# Patient Record
Sex: MALE | Race: AMERICAN INDIAN OR ALASKA NATIVE
[De-identification: names, ages, dates, MRNs, and addresses within clinical notes are randomized per-mention and may not be internally consistent; named-entity substitution may affect disease eponyms.]

---

## 2017-03-10 ENCOUNTER — HOSPITAL ENCOUNTER (EMERGENCY)
Dept: HOSPITAL 74 - JER | Age: 72
Discharge: HOME | End: 2017-03-10
Payer: COMMERCIAL

## 2017-03-10 VITALS — HEART RATE: 76 BPM | SYSTOLIC BLOOD PRESSURE: 117 MMHG | DIASTOLIC BLOOD PRESSURE: 79 MMHG | TEMPERATURE: 98 F

## 2017-03-10 VITALS — BODY MASS INDEX: 26.6 KG/M2
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
## 2017-03-10 DIAGNOSIS — J95.09: Primary | ICD-10-CM

## 2017-03-10 DIAGNOSIS — Z79.01: ICD-10-CM

## 2017-03-10 DIAGNOSIS — Z95.2: ICD-10-CM

## 2017-03-10 DIAGNOSIS — E78.5: ICD-10-CM

## 2017-03-10 DIAGNOSIS — J44.9: ICD-10-CM

## 2017-03-10 DIAGNOSIS — Z79.4: ICD-10-CM

## 2017-03-10 DIAGNOSIS — Z79.84: ICD-10-CM

## 2017-03-10 DIAGNOSIS — I11.9: ICD-10-CM

## 2017-03-10 DIAGNOSIS — I25.2: ICD-10-CM

## 2017-03-10 DIAGNOSIS — Z95.1: ICD-10-CM

## 2017-03-10 DIAGNOSIS — Z95.0: ICD-10-CM

## 2017-03-10 DIAGNOSIS — E11.9: ICD-10-CM

## 2017-03-10 PROCEDURE — 0B21XFZ CHANGE TRACHEOSTOMY DEVICE IN TRACHEA, EXTERNAL APPROACH: ICD-10-PCS

## 2017-03-10 NOTE — PDOC
History of Present Illness





- General


History Source: Patient, Family, Old Records


Exam Limitations: No Limitations





<Rodolfo Maxwell - Last Filed: 03/10/17 17:30>





- General


History Source: Patient, Family (Sons), Old Records


Exam Limitations: Clinical Condition





- History of Present Illness


Initial Comments: 


03/10/17 17:58





The patient is a 71 year old male, with a significant past medical history of 

anemia, hypertension, hyperlipidemia, diabetes, COPD s/p tracheostomy, chronic 

vent, coronary artery disease s/p MI (2013)/CABG/aortic valve replacement,  who 

presents to the emergency department needing a trach replacement. The patients 

sons are at the bedside. They state that the clip on the left side of his trach 

broke this morning. They report that they spoke to the patients ENT (Dr. Mcqueen) 

who advised them to come to the office for a trach replacement or to visit the 

ED for evaluation. They deny any fevers, cough, respiratory distress or any 

recent illnesses. HPI is primarily provided by the patients sons due to the 

patients baseline clinical condition. 





Allergies: None reported.


Past Surgical History: CABG; Aortic Valve Replacement; Pacemaker. 


Social History: Non smoker. Denies alcohol or drug use. 


PCP: Dr. Alvarez Cohn


ENT: Dr. Naresh Mcqueen 





<Sabi Moon - Last Filed: 03/10/17 22:40>





- General


Chief Complaint: Trach Tube Replacement


Stated Complaint: Trach Tube Replacement


Time Seen by Provider: 03/10/17 16:04





Past History





- Past Medical History


Anemia: Yes


Cardiac Disorders: Yes (MI in 2013, triple bypass and valve replacement)


COPD: Yes


CHF: Yes


Diabetes: Yes


HTN: Yes


Hypercholesterolemia: Yes





- Surgical History


Cardiac Surgery: Yes (triple bypass 2012/valve replacement)





- Immunization History


Immunization Up to Date: Yes





- Psycho/Social/Smoking Cessation Hx


Anxiety: No


Suicidal Ideation: No


Smoking History: Unknown if ever smoked


Have you smoked in the past 12 months: No


If you are a former smoker, when did you quit?: 2013


Information on smoking cessation initiated: No


Hx Alcohol Use: No


Drug/Substance Use Hx: No


Substance Use Type: None


Hx Substance Use Treatment: No





<Rodolfo Maxwell - Last Filed: 03/10/17 17:30>





<Sabi Moon - Last Filed: 03/10/17 22:40>





- Past Medical History


Allergies/Adverse Reactions: 


 Allergies











Allergy/AdvReac Type Severity Reaction Status Date / Time


 


No Known Allergies Allergy   Verified 03/10/17 16:08











Home Medications: 


Ambulatory Orders





Pantoprazole Sodium [Protonix -] 20 mg PO DAILY 04/20/16 


Acetaminophen [Tylenol .Regular Strength -] 650 mg PO Q6H PRN #0 tablet 11/10/

16 


Albuterol 2.5/Ipratropium 0.5 [Duoneb -] 1 amp NEB Q6H PRN #0 amp 11/10/16 


Alprazolam [Xanax] 0.25 mg PO Q8H PRN #90 tablet MDD 3 11/10/16 


Amino Acids/Protein Hydrolys [Prostat Sugar-Free Packet -] 30 ml PO BID@0800,

1730 #60 packet 11/10/16 


Aspirin [ASA -] 81 mg PO DAILY #30 tab.chew 11/10/16 


Atorvastatin Ca [Lipitor] 10 mg PO HS #30 tablet 11/10/16 


Cefuroxime Axetil [Ceftin -] 500 mg PO BID #4 tablet 11/10/16 


Ferrous Sulfate [Ferosul] 300 mg PO DAILY #1 bottle 11/10/16 


Furosemide [Lasix -] 20 mg PO BID@0600,1400 #60 tablet 11/10/16 


Insulin Detemir [Levemir Flextouch] 5 unit SQ DAILY #1 syringe 11/10/16 


Isopropyl Alcohol [Alcoh-Wipe] 1 each  BID #1 box 11/10/16 


Lancets/Blood Glucose Strips [Fora D10-[id number]-D77 Strp-Lnct] 1 each  BID #1 

combo..pkg 11/10/16 


Metformin HCl [Glucophage -] 500 mg PO BID@0700,1630 #60 tablet 11/10/16 


Metoprolol Succinate [Toprol XL -] 12.5 mg PO BID #60 tab.sr.24h 11/10/16 


Miscellaneous Medical Supply [Glucometer Device] 1 each [place] BID #1 kit 11/10/16 


Multivitamins [Multivit ([facility] Formul[facility])] 1 tab PO DAILY #30 tab 11/10/16 


Prednisone [Deltasone -] 7.5 mg PO DAILY #30 tablet 11/10/16 


Sennosides [Senna -] 1 tab PO BID #60 tablet 11/10/16 


Tamsulosin HCl [Flomax -] 0.4 mg PO DAILY@0830 #30 cap.er.24h 11/10/16 











**Review of Systems





- Review of Systems


Able to Perform ROS?: No


Comments:: 


03/10/17 17:46





Unable to perform ROS due to patients baseline clinical condition. 





<Sabi Moon - Last Filed: 03/10/17 22:40>





*Physical Exam





- Vital Signs


 Last Vital Signs











Temp Pulse Resp BP Pulse Ox


 


 98.0 F   76   20   117/79   100 


 


 03/10/17 16:08  03/10/17 16:08  03/10/17 16:08  03/10/17 16:08  03/10/17 16:08














<Rodolfo Maxwell - Last Filed: 03/10/17 17:30>





- Vital Signs


 Last Vital Signs











Temp Pulse Resp BP Pulse Ox


 


 98.0 F   76   20   117/79   100 


 


 03/10/17 16:08  03/10/17 16:08  03/10/17 16:08  03/10/17 16:08  03/10/17 16:08














- Physical Exam


Comments: 


03/10/17 17:45





GENERAL: Awake, alert, and fully oriented, in no acute distress. 


HEAD: No signs of trauma. 


EYES: PERRLA, EOMI, sclera anicteric, conjunctiva clear. 


ENT: Trach site is clean, dry and intact. Broken clip on left side of trach. 

Auricles normal inspection, hearing grossly normal, nares patent, oropharynx 

clear without exudates. Moist mucosa. 


NECK: Normal ROM, supple, no lymphadenopathy, JVD, or masses. 


LUNGS: Breath sounds equal, clear to auscultation bilaterally. No wheezes, and 

no crackles. 


HEART: Regular rate and rhythm, normal S1 and S2, no murmurs, rubs or gallops. 


ABDOMEN: Soft, nontender, normoactive bowel sounds. No guarding, no rebound. No 

masses. 


EXTREMITIES: Normal range of motion, no edema. No clubbing or cyanosis. No cords

, erythema, or tenderness. 


NEUROLOGICAL: Cranial nerves II through XII intact. Normal speech, gait 

deferred. 


SKIN: Warm, dry, normal turgor, no rashes or lesions noted. 





<Sabi Moon - Last Filed: 03/10/17 22:40>





ED Treatment Course





- RADIOLOGY


Radiology Studies Ordered: 














 Category Date Time Status


 


 CHEST X-RAY PORTABLE* [RAD] Stat Radiology  03/10/17 16:27 Taken














<Rodolfo Maxwell - Last Filed: 03/10/17 17:30>





Medical Decision Making





- Medical Decision Making





03/10/17 17:32





A portion of this note was documented by scribe services under my direction. I 

have reviewed the details of the note, within reason, and agree with the 

documentation with the following case summary and management plan written by 

me. 





Patient treated in the ED.





Nursing notes are reviewed and incorporated into the medical decision-making.


Vital signs reviewed.





Peripheral IV access obtained by the nurse, laboratory studies are drawn and 

sent, reviewed and interpreted by myself. 





 Vital Signs











Temp Pulse Resp BP Pulse Ox


 


 98.0 F   76   20   117/79   100 


 


 03/10/17 16:08  03/10/17 16:08  03/10/17 16:08  03/10/17 16:08  03/10/17 16:08








71 year old male with Past medical history of coronary disease, hypertension, 

hyperlipidemia, status post aortic valve replacement, COPD, diabetes, anemia, 

tracheostomy for COPD, chronically vent, lives at home brought in by sons for 

trach replacement. Patient has no complaints and is otherwise well. He has no 

respiratory distress, fevers, chills, cough. One of the clips had broken off. 

They had called Dr. Mcqueen's office would by the patient to go to the ER or to 

their office. Patient sons brought the patient to the ER for trach exchange.





I do not have any concerns for pneumonia or respiratory distress at this time. 

With the patient's and family's consent, we had exchange the trach with a 

cuffed 8.0 trach. The process was uncomplicated and there was no bleeding. 

Patient is breathing completely throughout the process. This was done with the 

airway box, ventilator, monitor, suction, respiratory therapist at the bedside. 

Chest x-ray was obtained and demonstrated proper placement of the trach. 

Patient feels comfortable to go home. I had contacted Dr. Mcqueen and let him aware 

of the details.





I discussed the physical exam findings, ancillary test results and final 

diagnoses with the patient.  I answered all of the patient's questions.  The 

patient was satisfied with the care received and felt comfortable with the 

discharge plan and treatment plan.  The patient will call their primary care 

physician within 24 hours to arrange follow-up and will return to the Emergency 

Department with any new, persistant or worsening symptoms. 





<Rodolfo Maxwell - Last Filed: 03/10/17 17:30>





- Medical Decision Making


03/10/17 17:45





Call placed to Dr. Naresh Mcqueen [phone] at 16:27. Connected and case 

discussed. 





EXAM: RAD/CHEST X-RAY PORTABLE


Reviewed By: Dr. Jaime Duke


IMPRESSION: Since 11/4/2016, the cardiac silhouette remains within normal 

limits in size. Tracheostomy tube is in satisfactory position. A left sided 

pacer again seen obscuring visualization of the left upper lobe, laterally. 

Status post median sternotomy. There are minimal perihilar increased lung 

markings without evidence of acute lung disease. Mediastinum and visualized 

osseous structures appear grossly intact. 





<Sabi Moon - Last Filed: 03/10/17 22:40>





*DC/Admit/Observation/Transfer





- Discharge Dispostion


Admit: No





<Rodolfo Maxwell - Last Filed: 03/10/17 17:30>





- Attestations


Scribe Attestion: 


03/10/17 17:34





Documentation prepared by Sabi Moon, acting as medical scribe for Rodolfo Maxwell MD. 





<Sabi Moon - Last Filed: 03/10/17 22:40>


Diagnosis at time of Disposition: 


 Encounter for tracheostomy tube change





- Discharge Dispostion


Disposition: HOME


Condition at time of disposition: Improved





- Referrals


Referrals: 


Alvarez Cohn MD [Primary Care Provider] - 





- Patient Instructions


Printed Discharge Instructions:  How to Take Care of a Tracheostomy


Additional Instructions: 


Your trach has been exchanged today with a 8.0 cuffed tube.


Please follow up with your doctor.


If you notice difficulty breathing or uncontrollable bleeding, please return to 

the ER for further evaluation.

## 2017-07-31 ENCOUNTER — HOSPITAL ENCOUNTER (EMERGENCY)
Dept: HOSPITAL 74 - JER | Age: 72
Discharge: HOME | End: 2017-07-31
Payer: COMMERCIAL

## 2017-07-31 VITALS — BODY MASS INDEX: 25 KG/M2

## 2017-07-31 VITALS — DIASTOLIC BLOOD PRESSURE: 68 MMHG | HEART RATE: 88 BPM | SYSTOLIC BLOOD PRESSURE: 114 MMHG

## 2017-07-31 VITALS — TEMPERATURE: 98 F

## 2017-07-31 DIAGNOSIS — I25.2: ICD-10-CM

## 2017-07-31 DIAGNOSIS — E78.00: ICD-10-CM

## 2017-07-31 DIAGNOSIS — Z95.1: ICD-10-CM

## 2017-07-31 DIAGNOSIS — I11.0: ICD-10-CM

## 2017-07-31 DIAGNOSIS — I25.10: ICD-10-CM

## 2017-07-31 DIAGNOSIS — J95.03: Primary | ICD-10-CM

## 2017-07-31 DIAGNOSIS — Z95.2: ICD-10-CM

## 2017-07-31 NOTE — PDOC
History of Present Illness





- General


Chief Complaint: Trach Tube Replacement


Stated Complaint: trach replacement


Time Seen by Provider: 07/31/17 11:22





- History of Present Illness


Initial Comments: 


07/31/17 19:12


73 yo M with complex past medical history who presents with tracheotomy care/

change. Last changed 3/2017. Pt. presents to ED for trach care at advice of ENT 

physician Dr. Mcqueen. Pt. son at bedside states that the strap insert broke on the 

left side. Recently evaluated at home by physician with complete metabolic 

workup. Son states that they perform trach care BID. Denies fevers/chills, 

change in appetite, wt change, chest pain, cough, acute GI or urinary symptoms. 











Past History





- Past Medical History


Allergies/Adverse Reactions: 


 Allergies











Allergy/AdvReac Type Severity Reaction Status Date / Time


 


No Known Allergies Allergy   Verified 03/10/17 16:08











Home Medications: 


Ambulatory Orders





Pantoprazole Sodium [Protonix -] 20 mg PO DAILY 04/20/16 


Acetaminophen [Tylenol .Regular Strength -] 650 mg PO Q6H PRN #0 tablet 11/10/

16 


Albuterol 2.5/Ipratropium 0.5 [Duoneb -] 1 amp NEB Q6H PRN #0 amp 11/10/16 


Alprazolam [Xanax] 0.25 mg PO Q8H PRN #90 tablet MDD 3 11/10/16 


Amino Acids/Protein Hydrolys [Prostat Sugar-Free Packet -] 30 ml PO BID@0800,

1730 #60 packet 11/10/16 


Aspirin [ASA -] 81 mg PO DAILY #30 tab.chew 11/10/16 


Atorvastatin Ca [Lipitor] 10 mg PO HS #30 tablet 11/10/16 


Cefuroxime Axetil [Ceftin -] 500 mg PO BID #4 tablet 11/10/16 


Ferrous Sulfate [Ferosul] 300 mg PO DAILY #1 bottle 11/10/16 


Furosemide [Lasix -] 20 mg PO BID@0600,1400 #60 tablet 11/10/16 


Insulin Detemir [Levemir Flextouch] 5 unit SQ DAILY #1 syringe 11/10/16 


Isopropyl Alcohol [Alcoh-Wipe] 1 each [place] BID #1 box 11/10/16 


Lancets/Blood Glucose Strips [Fora G06-C95-B62-K19 Strp-Lnct] 1 each [facility] BID #1 

combo..pkg 11/10/16 


Metformin HCl [Glucophage -] 500 mg PO BID@0700,1630 #60 tablet 11/10/16 


Metoprolol Succinate [Toprol XL -] 12.5 mg PO BID #60 tab.sr.24h 11/10/16 


Miscellaneous Medical Supply [Glucometer Device] 1 each [place] BID #1 kit 11/10/16 


Multivitamins [Multivit ([facility] Formul[facility])] 1 tab PO DAILY #30 tab 11/10/16 


Prednisone [Deltasone -] 7.5 mg PO DAILY #30 tablet 11/10/16 


Sennosides [Senna -] 1 tab PO BID #60 tablet 11/10/16 


Tamsulosin HCl [Flomax -] 0.4 mg PO DAILY@0830 #30 cap.er.24h 11/10/16 








Anemia: Yes


Cardiac Disorders: Yes (MI in 2013, triple bypass and valve replacement)


COPD: Yes


CHF: Yes


Diabetes: Yes


HTN: Yes


Hypercholesterolemia: Yes





- Surgical History


Cardiac Surgery: Yes (triple bypass 2012/valve replacement)





- Immunization History


Immunization Up to Date: Yes





- Psycho/Social/Smoking Cessation Hx


Anxiety: No


Suicidal Ideation: No


Smoking History: Unknown if ever smoked


Have you smoked in the past 12 months: No


If you are a former smoker, when did you quit?: 2013


Hx Alcohol Use: No


Drug/Substance Use Hx: No


Substance Use Type: None


Hx Substance Use Treatment: No





**Review of Systems





- Review of Systems


Comments:: 





07/31/17 19:16


GENERAL/CONSTITUTIONAL: No fever or chills. No weakness.


HEAD, EYES, EARS, NOSE AND THROAT: No change in vision. No ear pain or 

discharge. No sore throat.


CARDIOVASCULAR: No chest pain or shortness of breath


RESPIRATORY: No cough, wheezing, or hemoptysis.


GASTROINTESTINAL: No nausea, vomiting, diarrhea or constipation.


GENITOURINARY: No dysuria, frequency, or change in urination.


MUSCULOSKELETAL: No joint or muscle swelling or pain. No neck or back pain.


SKIN: No rash


NEUROLOGIC: No headache, vertigo, loss of consciousness, or change in strength/

sensation.


ENDOCRINE: No increased thirst. No abnormal weight change


HEMATOLOGIC/LYMPHATIC: No anemia, easy bleeding, or history of blood clots.


ALLERGIC/IMMUNOLOGIC: No hives or skin allergy.








*Physical Exam





- Vital Signs


 Last Vital Signs











Temp Pulse Resp BP Pulse Ox


 


 98.0 F   81   13   112/88   100 


 


 07/31/17 11:05  07/31/17 11:10  07/31/17 11:10  07/31/17 11:05  07/31/17 11:10














- Physical Exam


Comments: 





07/31/17 19:17





GENERAL: Awake, alert, and fully oriented, in no acute distress


HEAD: No signs of trauma, normocephalic, atraumatic 


EYES: PERRLA, EOMI, sclera anicteric, conjunctiva clear


ENT: Trach is midline and in place. c/d/i with absent signs of discharge,leakage

, or erythema from site of trach insertion site.  Auricles normal inspection, 

hearing grossly normal, nares patent, oropharynx clear without


exudates. Moist mucosa


NECK: Normal ROM, supple, no lymphadenopathy, JVD, or masses


LUNGS: No distress, speaks full sentences, clear to auscultation bilaterally 


HEART: Regular rate and rhythm, normal S1 and S2, no murmurs, rubs or gallops, 

peripheral pulses normal and equal bilaterally. 


ABDOMEN: Soft, nontender, normoactive bowel sounds.  No guarding, no rebound.  

No masses


EXTREMITIES: Normal inspection, Normal range of motion, no edema.  No clubbing 

or cyanosis. 


NEUROLOGICAL: Cranial nerves II through XII grossly intact.  Normal speech, 

normal gait, no focal sensorimotor deficits 


SKIN: Warm, Dry, normal turgor, no rashes or lesions noted. 








Procedures





- Intubation


Intubation Method: orotracheal (Tracheostomy tubing )


Tube Size (Fr): 8.0


Tube position confirmed by: Direct visualization, Breath sounds (Symmetric BL )


Breath Sounds after Intubation: equal


Intubation Complications: no complications


Post Intubation Xray: Yes





- Additional Procedures


Additional Procedures: tracheostomy





**Heart Score/ECG Review





- History


History: Slightly suspicious





- ECG Intrepretation


Rhythm: Regular Rhythm





- Axis


Axis: Left Axis Deviation





- ECG Impressions


Bradycardia: Yes


Heart Block: 1st Degree Block(>20mils)


Torsades arturo Pointes: No


WPW: No





ED Treatment Course





- RADIOLOGY


Radiology Studies Ordered: 














 Category Date Time Status


 


 CXRPORT [CHEST X-RAY PORTABLE*] [RAD] Stat Radiology  07/31/17 12:00 Ordered














Medical Decision Making





- Medical Decision Making





07/31/17 19:20


73 yo M with complex pmh who presents for tracheotomy change. Pt. and family 

state that he is instructed to come to ED every 3 months for trach change by 

ENT Dr. Mcqueen. Denies any signs or symptoms of infection at insertion site. No s/

s respiratory distress or complaints of aspiration. Pt. is stable on 

ventilation settings. EMS to take pt. home. 





ED Course: 


Trach replacement with size 8 cuffed non-fenestrated tubing. No complications 

with minimal blood loss from procedure. 





*DC/Admit/Observation/Transfer


Diagnosis at time of Disposition: 


 Tracheostomy care





- Discharge Dispostion


Admit: No





- Referrals


Referrals: 


Alvarez Cohn MD [Staff Physician] - 





- Patient Instructions


Printed Discharge Instructions:  Tracheotomy-Adult


Additional Instructions: 


Please return to ED in 3-4 months and follow up with Dr. Mcqueen in the interim. 

Return to ED if you experience any leaking, burning, or signs of infection from 

tracheostomy site. Thank you. 


Print Language: ENGLISH





- Attestations


Physician Attestion: 





07/31/17 12:27








I, Dr. Fito Douglas, attest that this document has been prepared under my 

direction and personally reviewed by me in its entirety.   I further attest, 

that it accurately reflects all work, treatment, procedures and medical decision

-making performed by me.

## 2017-07-31 NOTE — PDOC
Attending Attestation





- Resident


Resident Name: Fito Douglas





- ED Attending Attestation


I have performed the following: I have examined & evaluated the patient, The 

case was reviewed & discussed with the resident, I agree w/resident's findings 

& plan, Exceptions are as noted





- HPI


HPI: 





07/31/17 12:27


72-year-old male with multiple medical problems presents to the ED for routine 

tracheostomy change, last performed in March 2017. No complaints other than the 

plastic haas breaking a few days ago, no f/c/respiratory distress. 











- Physicial Exam


PE: 





07/31/17 12:31


VSS, well appearing


 Trach in place on vent, no drainage/bleeding, lungs clear











- Medical Decision Making





07/31/17 12:31


Patient seen and evaluated with the resident. I agree with the overall 

evaluation, assessment, and management with the following summary of visit:





With respiratory at bedside, O2 and suction setup, current trach was exchanged 

for new 8 cuffed non-fenestrated tube without complication. no bleeding. 

tolerated well, normal respirations on vent, trach placement confirmed with x-

ray. 


Son and EMS at bedside to accompany patient back home for continued care. They 

understand return criteria.

## 2017-07-31 NOTE — EKG
Test Reason : 

Blood Pressure : ***/*** mmHG

Vent. Rate : 080 BPM     Atrial Rate : 080 BPM

   P-R Int : 214 ms          QRS Dur : 070 ms

    QT Int : 338 ms       P-R-T Axes : 085 -48 101 degrees

   QTc Int : 389 ms

 

*** POOR DATA QUALITY, INTERPRETATION MAY BE ADVERSELY AFFECTED

SINUS RHYTHM WITH 1ST DEGREE A-V BLOCK

LEFT AXIS DEVIATION

LOW VOLTAGE QRS

CANNOT RULE OUT ANTEROSEPTAL INFARCT (CITED ON OR BEFORE 03-NOV-2016)

ABNORMAL ECG

WHEN COMPARED WITH ECG OF 03-NOV-2016 12:08,

COMPARED TO EKG

NO SIGNIFICANT CHANGE IS FOUND

Confirmed by KATERINE MAYA MD ([id number]) on 7/31/2017 12:13:36 PM

 

Referred By:             Confirmed By:KATERINE MAYA MD

## 2018-01-05 ENCOUNTER — HOSPITAL ENCOUNTER (INPATIENT)
Dept: HOSPITAL 74 - JER | Age: 73
LOS: 11 days | DRG: 207 | End: 2018-01-16
Attending: FAMILY MEDICINE | Admitting: FAMILY MEDICINE
Payer: COMMERCIAL

## 2018-01-05 VITALS — BODY MASS INDEX: 28.1 KG/M2

## 2018-01-05 DIAGNOSIS — F51.9: ICD-10-CM

## 2018-01-05 DIAGNOSIS — G47.00: ICD-10-CM

## 2018-01-05 DIAGNOSIS — I25.10: ICD-10-CM

## 2018-01-05 DIAGNOSIS — I25.5: ICD-10-CM

## 2018-01-05 DIAGNOSIS — E87.5: ICD-10-CM

## 2018-01-05 DIAGNOSIS — I10: ICD-10-CM

## 2018-01-05 DIAGNOSIS — E11.65: ICD-10-CM

## 2018-01-05 DIAGNOSIS — I95.9: ICD-10-CM

## 2018-01-05 DIAGNOSIS — R74.0: ICD-10-CM

## 2018-01-05 DIAGNOSIS — Z93.0: ICD-10-CM

## 2018-01-05 DIAGNOSIS — J96.21: ICD-10-CM

## 2018-01-05 DIAGNOSIS — G93.1: ICD-10-CM

## 2018-01-05 DIAGNOSIS — D72.829: ICD-10-CM

## 2018-01-05 DIAGNOSIS — I25.2: ICD-10-CM

## 2018-01-05 DIAGNOSIS — R33.9: ICD-10-CM

## 2018-01-05 DIAGNOSIS — Z95.1: ICD-10-CM

## 2018-01-05 DIAGNOSIS — I46.9: ICD-10-CM

## 2018-01-05 DIAGNOSIS — N17.9: ICD-10-CM

## 2018-01-05 DIAGNOSIS — E78.5: ICD-10-CM

## 2018-01-05 DIAGNOSIS — J96.22: Primary | ICD-10-CM

## 2018-01-05 DIAGNOSIS — D64.9: ICD-10-CM

## 2018-01-05 DIAGNOSIS — Z99.11: ICD-10-CM

## 2018-01-05 DIAGNOSIS — E87.2: ICD-10-CM

## 2018-01-05 DIAGNOSIS — J44.9: ICD-10-CM

## 2018-01-05 DIAGNOSIS — E87.0: ICD-10-CM

## 2018-01-05 DIAGNOSIS — I48.92: ICD-10-CM

## 2018-01-05 DIAGNOSIS — R56.9: ICD-10-CM

## 2018-01-05 LAB
ALBUMIN SERPL-MCNC: 2.6 G/DL (ref 3.4–5)
ALP SERPL-CCNC: 68 U/L (ref 45–117)
ALT SERPL-CCNC: 229 U/L (ref 12–78)
ANION GAP SERPL CALC-SCNC: 13 MMOL/L (ref 8–16)
ARTERIAL BLOOD GAS PCO2: 68.6 MMHG (ref 35–45)
ARTERIAL PATENCY WRIST A: POSITIVE
AST SERPL-CCNC: 296 U/L (ref 15–37)
BASE EXCESS BLDA CALC-SCNC: 1.7 MEQ/L (ref -2–2)
BILIRUB SERPL-MCNC: 0.5 MG/DL (ref 0.2–1)
BUN SERPL-MCNC: 26 MG/DL (ref 7–18)
CALCIUM SERPL-MCNC: 10.2 MG/DL (ref 8.5–10.1)
CHLORIDE SERPL-SCNC: 96 MMOL/L (ref 98–107)
CO2 SERPL-SCNC: 28 MMOL/L (ref 21–32)
COHGB MFR BLD: 1 GM% (ref 0.5–2)
CREAT SERPL-MCNC: 1.2 MG/DL (ref 0.7–1.3)
DEPRECATED RDW RBC AUTO: 18 % (ref 11.9–15.9)
GLUCOSE SERPL-MCNC: 463 MG/DL (ref 74–106)
HCT VFR BLD CALC: 35 % (ref 35.4–49)
HGB BLD-MCNC: 10.8 GM/DL (ref 11.7–16.9)
INR BLD: 1.3 (ref 0.82–1.09)
MAGNESIUM SERPL-MCNC: 1.8 MG/DL (ref 1.8–2.4)
MCH RBC QN AUTO: 25 PG (ref 25.7–33.7)
MCHC RBC AUTO-ENTMCNC: 30.9 G/DL (ref 32–35.9)
MCV RBC: 80.9 FL (ref 80–96)
PHOSPHATE SERPL-MCNC: 6.8 MG/DL (ref 2.5–4.9)
PLATELET # BLD AUTO: 205 K/MM3 (ref 134–434)
PMV BLD: 8.8 FL (ref 7.5–11.1)
PO2 BLDA: 377 MMHG (ref 70–100)
POTASSIUM SERPLBLD-SCNC: 3.3 MMOL/L (ref 3.5–5.1)
PROT SERPL-MCNC: 5.4 G/DL (ref 6.4–8.2)
PT PNL PPP: 14.7 SEC (ref 9.98–11.88)
RBC # BLD AUTO: 4.33 M/MM3 (ref 4–5.6)
SAO2 % BLDA: 99.7 % (ref 90–98.9)
SODIUM SERPL-SCNC: 137 MMOL/L (ref 136–145)
WBC # BLD AUTO: 12.8 K/MM3 (ref 4–10)

## 2018-01-05 RX ADMIN — SODIUM CHLORIDE SCH: 9 INJECTION, SOLUTION INTRAVENOUS at 21:57

## 2018-01-05 NOTE — PDOC
Attending Attestation





- Resident


Resident Name: BruceBlake





- ED Attending Attestation


I have performed the following: I have examined & evaluated the patient, The 

case was reviewed & discussed with the resident, I agree w/resident's findings 

& plan





- HPI


HPI: 





01/05/18 21:04


Pt's kids state that his trach collar balloon became deflated, but once his son 

inflated the balloon, pt went into arrest. EMS called, and they began chest 

compressions. Pt given epix2 and a bicarb. Blood sugar was in the 200s and pt 

was started on amiodarone once they got a rhythm. 


In the ER pt is deemed to be in PEA. We began chest compressions. Pt was 

overbagged by EMS and he was essentially having no blood output.  Pt was 

allowed to decompress his lungs, and after 3 rounds of epi and another bicarb 

and 2 calcium carbonates, pt responded with ROSC. Bedside sono demonstrated no 

pericardial effusion. Pt then given 5000U heparin and heparin drip started, as 

he has widened QRS on EKG and flipped Ts- different from the past EKG 6 mos ago.


Pt also got D50 and 10 U insulin, as he has had hyperkalemia in the past, and 

he seemed to respond to ca carbonate.  We had no information on the patient 

throughout the code and initial treatment of patient, EMS scooped and ran. No 

name of patient and no PMHx.





Upon arrival of family, we learned his name and age and PMHx and PSHx of 

defibrillator, prosthetic valve, and triple bypass.





- Physicial Exam


PE: 





01/06/18 06:58


Agree with resident exam. 





- Critical Care Time


Total Critical Care Time: 90


Critical Care Statement: The care of this patient involved high complexity 

decision making to prevent further life threatening deterioration of the patient

's condition and/or to evaluate & treat vital organ system(s) failure or risk 

of failure.





- Medical Decision Making





01/06/18 06:59


Pt will be admitted to ICU under hospitalist, who covers pt's PMD Jose. There 

are no ICU beds at this time.

## 2018-01-05 NOTE — CONSULT
Consult - text type





- Consultation


Consultation Note: 





Pulm/CCM





Pt seen and examined in ED prior to transfer to ICU





CC: cardiac arrest





Hx obtained from medical record, ED staff





HPI: Briefly Clive Bass is a 71 yo man with end stage COPD s/p tracheostomy, 

vent dependent, CAD s/p CABG and AVR who presented to ED in cardiac arrest in 

setting of ? trach dislodgement, over ventilation. It is a bit unclear from 

story but apparently pts cuff was deflated and they reinflated after which pt 

promptly desatted and progressed to cardiopulm arrest. EMS was called and 

resuscitation was performed. Pt became hyperinflated due to bagging and the 

arrest was prolonged (at least 2 epi, hco3, amio). Pt arrived in ED still in PEA

, another 10 min of ACLS before ROSC. ? approx 20 min in total. Apparently good 

CPR throughout with good Spo2 wave form. Pt was not awake post arrest. Hep gtt 

was started due to concerning EKG changes, which are resolving. 





Abg showed acute on chronic hypercapnea, BP was soft but maintained. Pt





 Vital Signs











Temp  97.2 F L  01/05/18 22:02


 


Pulse  85   01/05/18 23:13


 


Resp  20   01/05/18 23:13


 


BP  113/62   01/05/18 23:13


 


Pulse Ox  100   01/05/18 23:13








 Intake & Output











 01/04/18 01/05/18 01/05/18





 23:59 11:59 23:59


 


Output Total   60


 


Balance   -60


 


Weight   81.647 kg


 


Output:   


 


  Urine   60


 


    Lion   60


 


Other:   


 


  Voiding Method   Indwelling Catheter


 


  Height   5 ft 7 in


 


  Body Mass Index (BMI)   28.1


 


  Weight Measurement Method   Estimated by Staff

















Ambulatory Orders





Pantoprazole Sodium [Protonix -] 20 mg PO DAILY 04/20/16 


Acetaminophen [Tylenol .Regular Strength -] 650 mg PO Q6H PRN #0 tablet 11/10/

16 


Albuterol 2.5/Ipratropium 0.5 [Duoneb -] 1 amp NEB Q6H PRN #0 amp 11/10/16 


Alprazolam [Xanax] 0.25 mg PO Q8H PRN #90 tablet MDD 3 11/10/16 


Amino Acids/Protein Hydrolys [Prostat Sugar-Free Packet -] 30 ml PO BID@0800,

1730 #60 packet 11/10/16 


Aspirin [ASA -] 81 mg PO DAILY #30 tab.chew 11/10/16 


Atorvastatin Ca [Lipitor] 10 mg PO HS #30 tablet 11/10/16 


Cefuroxime Axetil [Ceftin -] 500 mg PO BID #4 tablet 11/10/16 


Ferrous Sulfate [Ferosul] 300 mg PO DAILY #1 bottle 11/10/16 


Furosemide [Lasix -] 20 mg PO BID@0600,1400 #60 tablet 11/10/16 


Insulin Detemir [Levemir Flextouch] 5 unit SQ DAILY #1 syringe 11/10/16 


Isopropyl Alcohol [Alcoh-Wipe] 1 each  BID #1 box 11/10/16 


Lancets/Blood Glucose Strips [Fora [id number] Strp-Lnct] 1 each  BID #1 

combo..pkg 11/10/16 


Metformin HCl [Glucophage -] 500 mg PO BID@0700,1630 #60 tablet 11/10/16 


Metoprolol Succinate [Toprol XL -] 12.5 mg PO BID #60 tab.sr.24h 11/10/16 


Miscellaneous Medical Supply [Glucometer Device] 1 each SCJ BID #1 kit 11/10/16 


Multivitamins [Multivit ([facility] Formulary)] 1 tab PO DAILY #30 tab 11/10/16 


Prednisone [Deltasone -] 7.5 mg PO DAILY #30 tablet 11/10/16 


Sennosides [Senna -] 1 tab PO BID #60 tablet 11/10/16 


Tamsulosin HCl [Flomax -] 0.4 mg PO DAILY@0830 #30 cap.er.24h 11/10/16 





 Past Medical History





Cardio/Vascular                  CAD (MI in 2013 -> CABG ),HTN (s/p aortic valve


                                 replacement in 2013),Hyperlipdemia,Other


Pulmonary                        COPD (since 2013),O2 Dependent (trache and vent


                                 dependent),Other








 Past Surgical History





Past Surgical History            CABG (in 2013),Cholecystectomy (right groin),


                                 Hernia Repair (in 2013),Valve Replacement





Smoking History





Smoking history                  Unknown if ever smoked


If you are a former smoker,      2013


when did you quit?               





Alcohol/Substance Use





Hx Alcohol Use                   No





Social History





Usual Living Arrangement         Other


ADL                              Support Services


Occupation                       retired 


History of Recent Travel         No





 ABG Results











ABG pH  7.26  (7.35-7.45)  L D 01/05/18  20:15    


 


ABG pCO2 at Pt Temp  68.6 mmHg (35-45)  H*  01/05/18  20:15    


 


ABG pO2 at Pt Temp  377.0 mmHg ()  H* D 01/05/18  20:15    


 


ABG HCO3  29.4 meq/L (22-26)  H  01/05/18  20:15    


 


ABG O2 Sat (Measured)  99.7 % (90-98.9)  H*  01/05/18  20:15    


 


ABG O2 Content  14.1 % vol (15-22)  L  01/05/18  20:15    


 


ABG Base Excess  1.7 meq/l (-2-2)   01/05/18  20:15    








 CBCD











WBC  12.8 K/mm3 (4.0-10.0)  H D 01/05/18  20:09    


 


RBC  4.33 M/mm3 (4.00-5.60)  D 01/05/18  20:09    


 


Hgb  10.8 GM/dL (11.7-16.9)  L D 01/05/18  20:09    


 


Hct  35.0 % (35.4-49)  L D 01/05/18  20:09    


 


MCV  80.9 fl (80-96)   01/05/18  20:09    


 


MCHC  30.9 g/dl (32.0-35.9)  L  01/05/18  20:09    


 


RDW  18.0 % (11.9-15.9)  H  01/05/18  20:09    


 


Plt Count  205 K/MM3 (134-434)  D 01/05/18  20:09    


 


MPV  8.8 fl (7.5-11.1)   01/05/18  20:09    








 CMP











Sodium  137 mmol/L (136-145)   01/05/18  20:09    


 


Potassium  3.3 mmol/L (3.5-5.1)  L  01/05/18  20:09    


 


Chloride  96 mmol/L ()  L  01/05/18  20:09    


 


Carbon Dioxide  28 mmol/L (21-32)  D 01/05/18  20:09    


 


Anion Gap  13  (8-16)   01/05/18  20:09    


 


BUN  26 mg/dL (7-18)  H D 01/05/18  20:09    


 


Creatinine  1.2 mg/dL (0.7-1.3)  D 01/05/18  20:09    


 


Creat Clearance w eGFR  59.68  (>60)   01/05/18  20:09    


 


Random Glucose  463 mg/dL ()  H* D 01/05/18  20:09    


 


Calcium  10.2 mg/dL (8.5-10.1)  H D 01/05/18  20:09    


 


Total Bilirubin  0.5 mg/dL (0.2-1.0)  D 01/05/18  20:09    


 


AST  296 U/L (15-37)  H D 01/05/18  20:09    


 


ALT  229 U/L (12-78)  H D 01/05/18  20:09    


 


Alkaline Phosphatase  68 U/L ()  D 01/05/18  20:09    


 


Total Protein  5.4 g/dl (6.4-8.2)  L  01/05/18  20:09    


 


Albumin  2.6 g/dl (3.4-5.0)  L  01/05/18  20:09    








 CARDIAC ENZYMES











Creatine Kinase  57 IU/L ()   01/05/18  20:09    


 


Troponin I  0.07 ng/ml (0.00-0.05)  H D 01/05/18  20:09

## 2018-01-05 NOTE — HP
Admitting History and Physical





- Primary Care Physician


PCP: Apoorva Garcia





- Admission


Chief Complaint: Difficulty Breathing, Cardiac Arrest


History of Present Illness: 





This is a 71 y/o man with a significant medical history of MI (), s/p 

Aortic Valve Replacement (),CABG, COPD, trach dependent, HTN, HLD, AICD (

2016). Who presents to the ED with O2 desaturation 50's, s/p Cardiac Arrest in 

field. Patient's son reports that the tracheotomy cuff deflated, he attempted 

to inflate and he noted that the patient's Spo2- 50%. He reports calling 911, 

for assistance. Per the patient's son, when the fire department arrived they 

began working on the patient. Per ED records, the patient lost pulses in the 

field and was resuscitated, CPR, Epi x2, Bicarb, Amiodarone bolus. The patient 

arrested in the ED and was given Epi x3. bicarb calcium chloride, D50, insulin 

with ROSC. Patient is admitted to ICU for s/p Cardiac Arrest, Acute Respiratory 

Failure, Hypercapnia








History Source: Family Member, Medical Record


Limitations to Obtaining History: Clinical Condition, Unresponsive





- Past Medical History


Cardiovascular: Yes: Aortic Insufficiency, CAD (MI in  -> CABG ), HTN, 

Hyperlipdemia, MI, Other (s/p aortic valve replacement in )


Pulmonary: Yes: COPD, O2 Dependent (since ), Other (trache and vent 

dependent)





- Past Surgical History


Past Surgical History: Yes: AICD, CABG (in ), Cholecystectomy, Hernia 

Repair (right groin), Valve Replacement (in )





- Advance Directives


Advance Directives: Yes: Health Care Proxy





- Smoking History


Smoking history: Unknown if ever smoked


Have you smoked in the past 12 months: No


If you are a former smoker, when did you quit?: 2013





- Alcohol/Substance Use


Hx Alcohol Use: No


History of Substance Use: reports: None





- Social History


Usual Living Arrangement: Yes: With Spouse, With Child


ADL: Support Services


Occupation: retired 


History of Recent Travel: No





Home Medications





- Allergies


Allergies/Adverse Reactions: 


 Allergies











Allergy/AdvReac Type Severity Reaction Status Date / Time


 


No Known Allergies Allergy   Verified 18 19:57














- Home Medications


Home Medications: 


Ambulatory Orders





Pantoprazole Sodium [Protonix -] 20 mg PO DAILY 16 


Acetaminophen [Tylenol .Regular Strength -] 650 mg PO Q6H PRN #0 tablet 11/10/

16 


Albuterol 2.5/Ipratropium 0.5 [Duoneb -] 1 amp NEB Q6H PRN #0 amp 11/10/16 


Alprazolam [Xanax] 0.25 mg PO Q8H PRN #90 tablet MDD 3 11/10/16 


Amino Acids/Protein Hydrolys [Prostat Sugar-Free Packet -] 30 ml PO BID@0800,

1730 #60 packet 11/10/16 


Aspirin [ASA -] 81 mg PO DAILY #30 tab.chew 11/10/16 


Atorvastatin Ca [Lipitor] 10 mg PO HS #30 tablet 11/10/16 


Cefuroxime Axetil [Ceftin -] 500 mg PO BID #4 tablet 11/10/16 


Ferrous Sulfate [Ferosul] 300 mg PO DAILY #1 bottle 11/10/16 


Furosemide [Lasix -] 20 mg PO BID@0600,1400 #60 tablet 11/10/16 


Insulin Detemir [Levemir Flextouch] 5 unit SQ DAILY #1 syringe 11/10/16 


Isopropyl Alcohol [Alcoh-Wipe] 1 each  BID #1 box 11/10/16 


Lancets/Blood Glucose Strips [Fora [id number] Strp-Lnct] 1 each  BID #1 

combo..pkg 11/10/16 


Metformin HCl [Glucophage -] 500 mg PO BID@0700,1630 #60 tablet 11/10/16 


Metoprolol Succinate [Toprol XL -] 12.5 mg PO BID #60 tab.sr.24h 11/10/16 


Miscellaneous Medical Supply [Glucometer Device] 1 each SCJ BID #1 kit 11/10/16 


Multivitamins [Multivit ([facility] Formulary)] 1 tab PO DAILY #30 tab 11/10/16 


Prednisone [Deltasone -] 7.5 mg PO DAILY #30 tablet 11/10/16 


Sennosides [Senna -] 1 tab PO BID #60 tablet 11/10/16 


Tamsulosin HCl [Flomax -] 0.4 mg PO DAILY@0830 #30 cap.er.24h 11/10/16 











Family Disease History





- Family Disease History


Family Disease History: Other: Son (alive and well)





Review of Systems


Unable to obtain ROS, reason: unresponsive





Physical Examination


Vital Signs: 


 Vital Signs











Temperature  0 F L  18 19:35


 


Pulse Rate  62   18 19:45


 


Respiratory Rate  20   18 20:00


 


Blood Pressure  187/84   18 19:45


 


O2 Sat by Pulse Oximetry (%)  100   18 19:45











Constitutional: Yes: Obese


HENT: Yes: WNL, Atraumatic, Normocephalic


Neck: Yes: Supple, Other (trach)


Cardiovascular: Yes: Pulse Irregular, S1, S2


Respiratory: Yes: Mechanically Ventilated (trach to ventilator), Rhonchi


Gastrointestinal: Yes: Abdomen, Obese


...Rectal Exam: Yes: Deferred


Renal/: Yes: Lion Present (yellow urine in drainage bag)


Breast(s): Yes: WNL


Musculoskeletal: Yes: WNL


Edema: Yes


Edema: LLE: 2+, RLE: 2+


Peripheral Pulses WNL: Yes


Neurological: Yes: Unresponsive (GSC 3)


Labs: 


 CBC, BMP





 18 20:09 





 18 20:09 





 Laboratory Results - last 24 hr











  18





  20:09 20:09 20:09


 


WBC   12.8 H D 


 


RBC   4.33  D 


 


Hgb   10.8 L D 


 


Hct   35.0 L D 


 


MCV   80.9 


 


MCH   25.0 L 


 


MCHC   30.9 L 


 


RDW   18.0 H 


 


Plt Count   205  D 


 


MPV   8.8 


 


Neutrophils %   No Result Required. 


 


Neutrophils % (Manual)   56.6 


 


Band Neutrophils %   0.0 


 


Lymphocytes %   No Result Required. 


 


Lymphocytes % (Manual)   28.3 


 


Monocytes % (Manual)   3 L 


 


Eosinophils % (Manual)   0.0 


 


Basophils % (Manual)   0.0 


 


Myelocytes % (Man)   4 H 


 


Metamyelocytes   2 


 


PT with INR    14.70 H


 


INR    1.30 H D


 


PTT (Actin FS)  > 400.0 H  


 


Puncture Site   


 


ABG pH   


 


ABG pCO2 at Pt Temp   


 


ABG pO2 at Pt Temp   


 


ABG HCO3   


 


ABG O2 Sat (Measured)   


 


ABG O2 Content   


 


ABG Base Excess   


 


Jamel Test   


 


Carboxyhemoglobin   


 


Methemoglobin   


 


O2 Delivery Device   


 


Oxygen Flow Rate   


 


Vent Mode   


 


Vent Rate   


 


Mechanical Rate   


 


PEEP   


 


Pressure Support Vent   


 


Sodium   


 


Potassium   


 


Chloride   


 


Carbon Dioxide   


 


Anion Gap   


 


BUN   


 


Creatinine   


 


Creat Clearance w eGFR   


 


Random Glucose   


 


Lactic Acid   


 


Calcium   


 


Phosphorus   


 


Magnesium   


 


Total Bilirubin   


 


AST   


 


ALT   


 


Alkaline Phosphatase   


 


Creatine Kinase   


 


Troponin I   


 


B-Natriuretic Peptide   


 


Total Protein   


 


Albumin   














  18





  20:09 20:09 20:09


 


WBC   


 


RBC   


 


Hgb   


 


Hct   


 


MCV   


 


MCH   


 


MCHC   


 


RDW   


 


Plt Count   


 


MPV   


 


Neutrophils %   


 


Neutrophils % (Manual)   


 


Band Neutrophils %   


 


Lymphocytes %   


 


Lymphocytes % (Manual)   


 


Monocytes % (Manual)   


 


Eosinophils % (Manual)   


 


Basophils % (Manual)   


 


Myelocytes % (Man)   


 


Metamyelocytes   


 


PT with INR   


 


INR   


 


PTT (Actin FS)   


 


Puncture Site   


 


ABG pH   


 


ABG pCO2 at Pt Temp   


 


ABG pO2 at Pt Temp   


 


ABG HCO3   


 


ABG O2 Sat (Measured)   


 


ABG O2 Content   


 


ABG Base Excess   


 


Jamel Test   


 


Carboxyhemoglobin   


 


Methemoglobin   


 


O2 Delivery Device   


 


Oxygen Flow Rate   


 


Vent Mode   


 


Vent Rate   


 


Mechanical Rate   


 


PEEP   


 


Pressure Support Vent   


 


Sodium  137  


 


Potassium  3.3 L  


 


Chloride  96 L  


 


Carbon Dioxide  28  D  


 


Anion Gap  13  


 


BUN  26 H D  


 


Creatinine  1.2  D  


 


Creat Clearance w eGFR  59.68  


 


Random Glucose  463 H* D  


 


Lactic Acid    5.4 H*


 


Calcium  10.2 H D  


 


Phosphorus   


 


Magnesium   


 


Total Bilirubin  0.5  D  


 


AST  296 H D  


 


ALT  229 H D  


 


Alkaline Phosphatase  68  D  


 


Creatine Kinase  57  


 


Troponin I  0.07 H D  


 


B-Natriuretic Peptide   1026.58 H 


 


Total Protein  5.4 L  


 


Albumin  2.6 L  














  18





  20:09 20:15 22:52


 


WBC   


 


RBC   


 


Hgb   


 


Hct   


 


MCV   


 


MCH   


 


MCHC   


 


RDW   


 


Plt Count   


 


MPV   


 


Neutrophils %   


 


Neutrophils % (Manual)   


 


Band Neutrophils %   


 


Lymphocytes %   


 


Lymphocytes % (Manual)   


 


Monocytes % (Manual)   


 


Eosinophils % (Manual)   


 


Basophils % (Manual)   


 


Myelocytes % (Man)   


 


Metamyelocytes   


 


PT with INR   


 


INR   


 


PTT (Actin FS)   


 


Puncture Site   Right radial 


 


ABG pH   7.26 L D 


 


ABG pCO2 at Pt Temp   68.6 H* 


 


ABG pO2 at Pt Temp   377.0 H* D 


 


ABG HCO3   29.4 H 


 


ABG O2 Sat (Measured)   99.7 H* 


 


ABG O2 Content   14.1 L 


 


ABG Base Excess   1.7 


 


Jamel Test   Positive 


 


Carboxyhemoglobin   1.0 


 


Methemoglobin   0.7 


 


O2 Delivery Device   Mech vent 


 


Oxygen Flow Rate   100% 


 


Vent Mode   A/c 


 


Vent Rate   20 


 


Mechanical Rate   Y 


 


PEEP   5.0 


 


Pressure Support Vent   450 


 


Sodium   


 


Potassium   


 


Chloride   


 


Carbon Dioxide   


 


Anion Gap   


 


BUN   


 


Creatinine   


 


Creat Clearance w eGFR   


 


Random Glucose   


 


Lactic Acid    3.9 H*


 


Calcium   


 


Phosphorus  6.8 H D  


 


Magnesium  1.8  


 


Total Bilirubin   


 


AST   


 


ALT   


 


Alkaline Phosphatase   


 


Creatine Kinase   


 


Troponin I   


 


B-Natriuretic Peptide   


 


 


Total Protein   


 


Albumin   














Imaging





- Results


Chest X-ray: Image Reviewed





Problem List





- Problems


(1) Cardiac arrest


Code(s): I46.9 - CARDIAC ARREST, CAUSE UNSPECIFIED   





(2) Acute and chronic respiratory failure with hypercapnia


Code(s): J96.22 - ACUTE AND CHRONIC RESPIRATORY FAILURE WITH HYPERCAPNIA   





(3) Tracheostomy malfunction


Code(s): J95.03 - MALFUNCTION OF TRACHEOSTOMY STOMA   





(4) Lactic acidosis


Code(s): E87.2 - ACIDOSIS   





(5) Leukocytosis


Code(s): D72.829 - ELEVATED WHITE BLOOD CELL COUNT, UNSPECIFIED   





(6) CAD (coronary artery disease)


Code(s): I25.10 - ATHSCL HEART DISEASE OF NATIVE CORONARY ARTERY W/O ANG PCTRS 

  





(7) COPD (chronic obstructive pulmonary disease)


Code(s): J44.9 - CHRONIC OBSTRUCTIVE PULMONARY DISEASE, UNSPECIFIED   


Qualifiers: 


   Emphysema type: unspecified 





(8) Tracheostomy care


Code(s): Z43.0 - ENCOUNTER FOR ATTENTION TO TRACHEOSTOMY   





(9) Ventilator dependence


Code(s): Z99.11 - DEPENDENCE ON RESPIRATOR [VENTILATOR] STATUS   





(10) DVT prophylaxis


Code(s): [id number] -    





Assessment/Plan





This is a 71 y/o man with a PMHx of: MI(), s/p Aortic Valve Replacement(

), CABG, HTN, HLD, COPD (trach dependent). Admitted to ICU for s/p Cardiac 

Arrest, Acute Respiratory Failure, Hypercapnia. 





Plan:





1. s/p Cardiac Arrest- Possibly secondary to Respiratory Failure vs AMI. 

Continue Cardiac Monitoring, resuscitative events in field and ED- ROSC. 

Continue IV fluid, Maintain NGT, Replete lytes, Monitor CBC, Appreciate 

Cardiology Consult, Serial Enzymes, continue Heparin Drip secondary to EKG 

changes concerning for MI.





2. Acute Respiratory Failure, Hypercapnia- Continue trach- ventilator Vent 

Settings: , FIO2 50%, Rate 24, PEEP 5. AB.26/68.6/377/29.4/99- Fio2 

decreased from 100- 50%. will repeat ABG in am. Appreciate Pulmonology Consult. 

Chest Xray-reviewed





3. MI hx- s/p stent-  Initial EKG changes noted- Heparin given in ED, Serial 

Enzymes, Appreciate Cardiology consult. Continue Asa.





4. Hypertension- Monitor BP, Home med held secondary to hypotension





5. Hyperlipidemia- Continue Lipitor





6. COPD- Continue trach- ventilator, Duonebs, Appreciate Pulmonology Consult





7. Diabetes Mellitus- BGMs, Hold Metformin secondary to Lactic Acidosis 





8. Hyperglycemia- Likely secondary to D50 given during resuscitation- BGMs, ISS

, Hgb A1c in am 





9. Lactic Acidosis- Likely secondary to s/p cardiac arrest, Trend LA





10. Leukocytosis- Likely infectious vs inflammatory. Blood Cultures-pending, 

Urine Culture pending-Monitor CBC, Appreciate ID Consult





11. FEN- IVF, Replete lyted prn, NPO





12. DVT Prophylaxis- SCDs, Heparin





Code Status: Full Code, HCP





Dispo: Requires Inpatient Care











 





Visit type





- Emergency Visit


Emergency Visit: Yes


ED Registration Date: 18


Care time: The patient presented to the Emergency Department on the above date 

and was hospitalized for further evaluation of their emergent condition.





- New Patient


This patient is new to me today: Yes


Date on this admission: 18





- Critical Care


Critical Care patient: Yes


Total Critical Care Time (in minutes): 60


Critical Care Statement: The care of this patient involved high complexity 

decision making to prevent further life threatening deterioration of the patient

's condition and/or to evaluate & treat vital organ system(s) failure or risk 

of failure.

## 2018-01-05 NOTE — PDOC
History of Present Illness





<Raghu Bojorquez - Last Filed: 01/05/18 20:42>





- History of Present Illness


Initial Comments: 





01/05/18 20:02





Pt unresponsive. History from family and records.





Pt is a 71 y/o M w/ PMH tracheostomy (vent dependent), COPD, DM2 (uncontrolled)

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 

with hypoxia. Pt was unresponsive and pulseless on arrival. Per family, pt had 

his trach cuff deflated and re-inflated today after which pt began to 

desaturate. BGM at home was 220. Pt's family gave some long-acting insulin at 

home. Pt did not improve and was brought to ED.





En route pt received 2 Epi, 1 dose Amio, and 1 amp bicarb.





On arriving, pt was in PEA and unresponsive. Compressions were started at 7:30. 

ROSC was achieved at 7:40. Pt still unresponsive.  Bedside cardiac echo did not 

reveal tamponade. EKG & CXR were ordered. 





In ED, pt received 2 CaCl, 1 amp D50, 10 units Ins, 3 Epi, Hep drip + fair.




















<Blake Barrera - Last Filed: 01/06/18 05:04>





<Monica Cain - Last Filed: 01/06/18 19:41>





- General


Chief Complaint: Cardiac Arrest


Stated Complaint: CARDIAC ARREST


Time Seen by Provider: 01/05/18 19:50





Past History





<Raghu Bojorquez - Last Filed: 01/05/18 20:42>





- Past Medical History


Anemia: Yes


Cardiac Disorders: Yes (MI in 2013, triple bypass and valve replacement)


COPD: Yes


CHF: Yes


Diabetes: Yes


HTN: Yes


Hypercholesterolemia: Yes





- Surgical History


Cardiac Surgery: Yes (triple bypass 2012/valve replacement)





- Immunization History


Immunization Up to Date: Yes





- Suicide/Smoking/Psychosocial Hx


Smoking History: Unknown if ever smoked


Have you smoked in the past 12 months: No


If you are a former smoker, when did you quit?: 2013


Hx Alcohol Use: No


Drug/Substance Use Hx: No


Substance Use Type: None


Hx Substance Use Treatment: No





<Blake Barrera - Last Filed: 01/06/18 05:04>





<Cain,Monica - Last Filed: 01/06/18 19:41>





- Past Medical History


Allergies/Adverse Reactions: 


 Allergies











Allergy/AdvReac Type Severity Reaction Status Date / Time


 


No Known Allergies Allergy   Verified 01/06/18 07:15











Home Medications: 


Ambulatory Orders





Pantoprazole Sodium [Protonix -] 20 mg PO DAILY 04/20/16 


Acetaminophen [Tylenol .Regular Strength -] 650 mg PO Q6H PRN #0 tablet 11/10/

16 


Albuterol 2.5/Ipratropium 0.5 [Duoneb -] 1 amp NEB Q6H PRN #0 amp 11/10/16 


Alprazolam [Xanax] 0.25 mg PO Q8H PRN #90 tablet MDD 3 11/10/16 


Amino Acids/Protein Hydrolys [Prostat Sugar-Free Packet -] 30 ml PO BID@0800,

1730 #60 packet 11/10/16 


Aspirin [ASA -] 81 mg PO DAILY #30 tab.chew 11/10/16 


Atorvastatin Ca [Lipitor] 10 mg PO HS #30 tablet 11/10/16 


Cefuroxime Axetil [Ceftin -] 500 mg PO BID #4 tablet 11/10/16 


Ferrous Sulfate [Ferosul] 300 mg PO DAILY #1 bottle 11/10/16 


Furosemide [Lasix -] 20 mg PO BID@0600,1400 #60 tablet 11/10/16 


Insulin Detemir [Levemir Flextouch] 5 unit SQ DAILY #1 syringe 11/10/16 


Isopropyl Alcohol [Alcoh-Wipe] 1 each  BID #1 box 11/10/16 


Lancets/Blood Glucose Strips [Fora [id number] Strp-Lnct] 1 each  BID #1 

combo..pkg 11/10/16 


Metformin HCl [Glucophage -] 500 mg PO BID@0700,1630 #60 tablet 11/10/16 


Metoprolol Succinate [Toprol XL -] 12.5 mg PO BID #60 tab.sr.24h 11/10/16 


Miscellaneous Medical Supply [Glucometer Device] 1 each [facility] BID #1 kit 11/10/16 


Multivitamins [Multivit ([facility] Formulary)] 1 tab PO DAILY #30 tab 11/10/16 


Prednisone [Deltasone -] 7.5 mg PO DAILY #30 tablet 11/10/16 


Sennosides [Senna -] 1 tab PO BID #60 tablet 11/10/16 


Tamsulosin HCl [Flomax -] 0.4 mg PO DAILY@0830 #30 cap.er.24h 11/10/16 











**Review of Systems





- Review of Systems


Able to Perform ROS?: No





<Blake Barrera - Last Filed: 01/06/18 05:04>





*Physical Exam





- Vital Signs


 Last Vital Signs











Temp Pulse Resp BP Pulse Ox


 


 0 F L  0 L  0 L  000/00    


 


 01/05/18 19:35  01/05/18 19:35  01/05/18 19:35  01/05/18 19:35   














<Raghu Bojorquez - Last Filed: 01/05/18 20:42>





- Vital Signs


 Last Vital Signs











Temp Pulse Resp BP Pulse Ox


 


 0 F L  0 L  0 L  000/00    


 


 01/05/18 19:35  01/05/18 19:35  01/05/18 19:35  01/05/18 19:35   














<Blake Barrera - Last Filed: 01/06/18 05:04>





- Vital Signs


 Last Vital Signs











Temp Pulse Resp BP Pulse Ox


 


 0 F L  0 L  20   000/00    


 


 01/05/18 19:35  01/05/18 19:35  01/05/18 20:00  01/05/18 19:35   














<Monica Cain - Last Filed: 01/06/18 19:41>





ED Treatment Course





- LABORATORY


CBC & Chemistry Diagram: 


 01/05/18 20:09





 01/05/18 20:09





- ADDITIONAL ORDERS


Additional order review: 


 Laboratory  Results











  01/05/18





  20:15


 


Puncture Site  Right radial


 


ABG pH  7.26 L D


 


ABG pCO2 at Pt Temp  68.6 H*


 


ABG pO2 at Pt Temp  377.0 H* D


 


ABG HCO3  29.4 H


 


ABG O2 Sat (Measured)  99.7 H*


 


ABG O2 Content  14.1 L


 


ABG Base Excess  1.7


 


Jamel Test  Positive


 


Carboxyhemoglobin  1.0


 


Methemoglobin  0.7


 


O2 Delivery Device  Mech vent


 


Oxygen Flow Rate  100%


 


Vent Mode  A/c


 


Vent Rate  20


 


Mechanical Rate  Y


 


PEEP  5.0


 


Pressure Support Vent  450








 











  01/05/18





  20:09


 


RBC  4.33  D


 


MCV  80.9


 


MCHC  30.9 L


 


RDW  18.0 H


 


MPV  8.8


 


Neutrophils %  No Result Required.


 


Lymphocytes %  No Result Required.














<Raghu Bojorquez - Last Filed: 01/05/18 20:42>





- LABORATORY


CBC & Chemistry Diagram: 


 01/05/18 20:09





 01/05/18 20:09





<Blake Barrera - Last Filed: 01/06/18 05:04>





- LABORATORY


CBC & Chemistry Diagram: 


 01/06/18 05:50





 01/06/18 05:50





- ADDITIONAL ORDERS


Additional order review: 


 Laboratory  Results











  01/05/18 01/05/18 01/05/18





  20:15 20:09 20:09


 


PT with INR   


 


INR   


 


PTT (Actin FS)   


 


Puncture Site  Right radial  


 


ABG pH  7.26 L D  


 


ABG pCO2 at Pt Temp  68.6 H*  


 


ABG pO2 at Pt Temp  377.0 H* D  


 


ABG HCO3  29.4 H  


 


ABG O2 Sat (Measured)  99.7 H*  


 


ABG O2 Content  14.1 L  


 


ABG Base Excess  1.7  


 


Jamel Test  Positive  


 


Carboxyhemoglobin  1.0  


 


Methemoglobin  0.7  


 


O2 Delivery Device  Mech vent  


 


Oxygen Flow Rate  100%  


 


Vent Mode  A/c  


 


Vent Rate  20  


 


Mechanical Rate  Y  


 


PEEP  5.0  


 


Pressure Support Vent  450  


 


Sodium   


 


Potassium   


 


Chloride   


 


Carbon Dioxide   


 


Anion Gap   


 


BUN   


 


Creatinine   


 


Creat Clearance w eGFR   


 


Random Glucose   


 


Lactic Acid   5.4 H* 


 


Calcium   


 


Total Bilirubin   


 


AST   


 


ALT   


 


Alkaline Phosphatase   


 


Creatine Kinase   


 


Troponin I   


 


B-Natriuretic Peptide    1026.58 H


 


Total Protein   


 


Albumin   














  01/05/18 01/05/18 01/05/18





  20:09 20:09 20:09


 


PT with INR   14.70 H 


 


INR   1.30 H D 


 


PTT (Actin FS)    > 400.0 H


 


Puncture Site   


 


ABG pH   


 


ABG pCO2 at Pt Temp   


 


ABG pO2 at Pt Temp   


 


ABG HCO3   


 


ABG O2 Sat (Measured)   


 


ABG O2 Content   


 


ABG Base Excess   


 


Jamel Test   


 


Carboxyhemoglobin   


 


Methemoglobin   


 


O2 Delivery Device   


 


Oxygen Flow Rate   


 


Vent Mode   


 


Vent Rate   


 


Mechanical Rate   


 


PEEP   


 


Pressure Support Vent   


 


Sodium  137  


 


Potassium  3.3 L  


 


Chloride  96 L  


 


Carbon Dioxide  28  D  


 


Anion Gap  13  


 


BUN  26 H D  


 


Creatinine  1.2  D  


 


Creat Clearance w eGFR  59.68  


 


Random Glucose  463 H* D  


 


Lactic Acid   


 


Calcium  10.2 H D  


 


Total Bilirubin  0.5  D  


 


AST  296 H D  


 


ALT  229 H D  


 


Alkaline Phosphatase  68  D  


 


Creatine Kinase  57  


 


Troponin I  0.07 H D  


 


B-Natriuretic Peptide   


 


Total Protein  5.4 L  


 


Albumin  2.6 L  








 











  01/05/18





  20:09


 


RBC  4.33  D


 


MCV  80.9


 


MCHC  30.9 L


 


RDW  18.0 H


 


MPV  8.8


 


Neutrophils %  No Result Required.


 


Lymphocytes %  No Result Required.














- RADIOLOGY


Radiology Studies Ordered: 














 Category Date Time Status


 


 CHEST X-RAY PORTABLE* [RAD] Stat Radiology  01/05/18 19:50 Completed














- Medications


Given in the ED: 


ED Medications














Discontinued Medications














Generic Name Dose Route Start Last Admin





  Trade Name Tramaine  PRN Reason Stop Dose Admin


 


Furosemide  40 mg  01/05/18 19:51  01/05/18 21:09





  Lasix Injection -  IVPUSH  01/05/18 19:52  Not Given





  ONCE ONE   


 


Sodium Chloride  1,000 ml  01/05/18 20:55  01/05/18 21:09





  Normal Saline -  IV  01/05/18 20:56  1,000 ml





  ONCE ONE   Administration














<Monica Cain - Last Filed: 01/06/18 19:41>





Medical Decision Making





- Medical Decision Making





01/05/18 


8:31 Call placed to ICU for inpatient admission.





8:33pm Call placed to Dr. Garcia's answering service, hospitalist is on call. 








<Raghu Bojorquez - Last Filed: 01/05/18 20:42>





- Medical Decision Making





01/05/18 21:00





Pt is a 72 M w/ PMH COPD, tracheostomy tube (unclear if vent dependent at home)

, uncontrolled DM2, MI s/p CABG w/ Ao valve replacement 2013, HTN, HLD who came 

to ED in PEA arrest. Pt was coded for 10 min, and ROSC was achieved.








-EKG changes evident. Pt heparinized


-Pt placed on vent. FiO2 100, RR 20, Tidal vol 450, PEEP 5, Peak flow 60


-EtCO2 37


-Labs significant for WBC 12, K 3.3, rand glucose 463 ( en route to 

hospital), gap 13, , , Tn 0.07, BNP 1026, INR 1.30, PTT >400


-Plan to admit to ICU








01/05/18 21:15


Spoke with hospitalist. Pt is accepted for admission to ICU. Rec possible 

cooling protocol.


Case d/w attending.























01/05/18 22:50





Pt's BP was 140 sys but was tachypnic. Versed was started, but BP dropped 

significantly, so versed was held. 


Current BP 65/54. HR 78. O2 sat 100. Fluids wide open. Sedation being held.








01/05/18 23:13


HR 86, /86, O2 sat 100. Fluids still running. Sedation still being held.








01/06/18 05:04


Vital signs have continued to be stable











<Blake Barrera - Last Filed: 01/06/18 05:04>





- Critical Care Time


Total Critical Care Time (minutes): 90


Critical Care Statement: The care of this patient involved high complexity 

decision making to prevent further life threatening deterioration of the patient

's condition and/or to evaluate & treat vital organ system(s) failure or risk 

of failure.





<Monica Cain - Last Filed: 01/06/18 19:41>





*DC/Admit/Observation/Transfer





- Attestations


Scribe Attestion: 





01/05/18 20:43





Documentation prepared by Raghu Bojorquez, acting as medical scribe for 

Monica Cain MD.





<Raghu Bojorquez - Last Filed: 01/05/18 20:42>





- Discharge Dispostion


Admit: Yes





<Blake Barrera - Last Filed: 01/06/18 05:04>





- Discharge Dispostion


Admit: Yes





<Monica Cain - Last Filed: 01/06/18 19:41>


Diagnosis at time of Disposition: 


 Cardiac arrest








- Discharge Dispostion


Condition at time of disposition: Critical

## 2018-01-06 LAB
ALBUMIN SERPL-MCNC: 3.1 G/DL (ref 3.4–5)
ALP SERPL-CCNC: 68 U/L (ref 45–117)
ALT SERPL-CCNC: 213 U/L (ref 12–78)
ANION GAP SERPL CALC-SCNC: 10 MMOL/L (ref 8–16)
ARTERIAL BLOOD GAS PCO2: 44.3 MMHG (ref 35–45)
ARTERIAL BLOOD GAS PCO2: 44.6 MMHG (ref 35–45)
ARTERIAL BLOOD GAS PCO2: 64 MMHG (ref 35–45)
ARTERIAL PATENCY WRIST A: POSITIVE
ARTERIAL PATENCY WRIST A: POSITIVE
AST SERPL-CCNC: 137 U/L (ref 15–37)
BASE EXCESS BLDA CALC-SCNC: -0.7 MEQ/L (ref -2–2)
BASE EXCESS BLDA CALC-SCNC: 0.8 MEQ/L (ref -2–2)
BASE EXCESS BLDA CALC-SCNC: 3.9 MEQ/L (ref -2–2)
BASOPHILS # BLD: 0.5 % (ref 0–2)
BILIRUB SERPL-MCNC: 0.7 MG/DL (ref 0.2–1)
BUN SERPL-MCNC: 29 MG/DL (ref 7–18)
CALCIUM SERPL-MCNC: 9.2 MG/DL (ref 8.5–10.1)
CHLORIDE SERPL-SCNC: 101 MMOL/L (ref 98–107)
CO2 SERPL-SCNC: 30 MMOL/L (ref 21–32)
CREAT SERPL-MCNC: 0.9 MG/DL (ref 0.7–1.3)
DEPRECATED RDW RBC AUTO: 17.9 % (ref 11.9–15.9)
EOSINOPHIL # BLD: 0.1 % (ref 0–4.5)
GLUCOSE SERPL-MCNC: 118 MG/DL (ref 74–106)
HCT VFR BLD CALC: 33.9 % (ref 35.4–49)
HGB BLD-MCNC: 10.7 GM/DL (ref 11.7–16.9)
LYMPHOCYTES # BLD: 8.4 % (ref 8–40)
MCH RBC QN AUTO: 24.9 PG (ref 25.7–33.7)
MCHC RBC AUTO-ENTMCNC: 31.6 G/DL (ref 32–35.9)
MCV RBC: 78.7 FL (ref 80–96)
MONOCYTES # BLD AUTO: 6.5 % (ref 3.8–10.2)
NEUTROPHILS # BLD: 84.5 % (ref 42.8–82.8)
PLATELET # BLD AUTO: 200 K/MM3 (ref 134–434)
PMV BLD: 8.5 FL (ref 7.5–11.1)
PO2 BLDA: 133 MMHG (ref 70–100)
PO2 BLDA: 152 MMHG (ref 70–100)
PO2 BLDA: 162 MMHG (ref 70–100)
POTASSIUM SERPLBLD-SCNC: 3.8 MMOL/L (ref 3.5–5.1)
PROT SERPL-MCNC: 5.7 G/DL (ref 6.4–8.2)
RBC # BLD AUTO: 4.3 M/MM3 (ref 4–5.6)
SAO2 % BLDA: 98.3 % (ref 90–98.9)
SAO2 % BLDA: 99.3 % (ref 90–98.9)
SAO2 % BLDA: 99.5 % (ref 90–98.9)
SODIUM SERPL-SCNC: 141 MMOL/L (ref 136–145)
WBC # BLD AUTO: 11.9 K/MM3 (ref 4–10)

## 2018-01-06 PROCEDURE — 5A1955Z RESPIRATORY VENTILATION, GREATER THAN 96 CONSECUTIVE HOURS: ICD-10-PCS | Performed by: FAMILY MEDICINE

## 2018-01-06 PROCEDURE — 03HY32Z INSERTION OF MONITORING DEVICE INTO UPPER ARTERY, PERCUTANEOUS APPROACH: ICD-10-PCS

## 2018-01-06 PROCEDURE — 05HM33Z INSERTION OF INFUSION DEVICE INTO RIGHT INTERNAL JUGULAR VEIN, PERCUTANEOUS APPROACH: ICD-10-PCS

## 2018-01-06 RX ADMIN — ASPIRIN 81 MG SCH MG: 81 TABLET ORAL at 10:16

## 2018-01-06 RX ADMIN — DEXTROSE MONOHYDRATE SCH MLS/HR: 50 INJECTION, SOLUTION INTRAVENOUS at 23:45

## 2018-01-06 RX ADMIN — POTASSIUM CHLORIDE SCH MLS/HR: 7.46 INJECTION, SOLUTION INTRAVENOUS at 06:29

## 2018-01-06 RX ADMIN — CHLORHEXIDINE GLUCONATE SCH APPLIC: 213 SOLUTION TOPICAL at 22:00

## 2018-01-06 RX ADMIN — SODIUM CHLORIDE SCH MLS/HR: 9 INJECTION, SOLUTION INTRAVENOUS at 23:00

## 2018-01-06 RX ADMIN — POTASSIUM CHLORIDE SCH MLS/HR: 7.46 INJECTION, SOLUTION INTRAVENOUS at 08:31

## 2018-01-06 RX ADMIN — MUPIROCIN SCH: 20 OINTMENT TOPICAL at 06:18

## 2018-01-06 RX ADMIN — METHYLPREDNISOLONE SODIUM SUCCINATE SCH MG: 125 INJECTION, POWDER, FOR SOLUTION INTRAMUSCULAR; INTRAVENOUS at 18:31

## 2018-01-06 RX ADMIN — MUPIROCIN SCH: 20 OINTMENT TOPICAL at 10:03

## 2018-01-06 RX ADMIN — CHLORHEXIDINE GLUCONATE SCH: 213 SOLUTION TOPICAL at 06:18

## 2018-01-06 RX ADMIN — ATORVASTATIN CALCIUM SCH: 10 TABLET, FILM COATED ORAL at 22:00

## 2018-01-06 RX ADMIN — PROPOFOL SCH MLS/HR: 10 INJECTION, EMULSION INTRAVENOUS at 18:43

## 2018-01-06 RX ADMIN — SODIUM CHLORIDE SCH MLS/HR: 9 INJECTION, SOLUTION INTRAVENOUS at 06:00

## 2018-01-06 NOTE — CONSULT
Consult - text type





- Consultation


Consultation Note: 








NEUROLOGY CONSULTATION is greatly appreciated:





Events reviewed and discuss with intensivist.


This 71 yo man with multiple medical problems and advanced COPD is ventilator 

dependent but active and ambulatory at home.


Today had trache cuff dysfunction leading to hypoventilation and 

cardiorespiratory arrest.


Twice resuscitated. Now in ICU-not yet sedated.





Exam: Unresponsive to name or sternal pain.


         Over-breathing the ventilator.


         Right pupil 4 mm left 1 1/2- neither reactive to light.


         No corneals.


         Flaccid areflexic tetraplegia.





IMP: Severe, B/L cerebral dysfunction with brainstem function present.


       c/w Severe anoxic encephalopathy.


  





SUGGEST: Continue conservative Rx and supportive care.


               Will follow with you.


               Prognosis guarded.





Thank you very much,


Max Nixon MD

## 2018-01-06 NOTE — HP
Admitting History and Physical





- Admission


History of Present Illness: 





 71 y/o M w/ PMH tracheostomy (vent dependent), COPD, DM2 (uncontrolled), MI s/

p CABG with Ao valve replacement 2013, s/p PPM HTN, HLD who was BIBEMS with 

hypoxia. Pt was unresponsive and pulseless on arrival. Per family, pt had his 

trach cuff deflated and re-inflated today after which pt began to desaturate. 

BGM at home was 220. Pt's family gave some long-acting insulin at home. Pt did 

not improve and was brought to ED.





En route pt received 2 Epi, 1 dose Amio, and 1 amp bicarb.





On arriving, pt was in PEA and unresponsive. Compressions were started at 7:30. 

ROSC was achieved at 7:40. Pt still unresponsive.  Bedside cardiac echo did not 

reveal tamponade. 





PT WAS SEEN IN ER SONS AT BEDSIDE


UNRESPONSIVE ON VENT





- Past Medical History


Cardiovascular: Yes: Aortic Insufficiency, CAD (MI in 2013 -> CABG ), HTN, 

Hyperlipdemia, MI, Other (s/p aortic valve replacement in 2013)


Pulmonary: Yes: COPD, O2 Dependent (since 2013), Other (trache and vent 

dependent)





- Past Surgical History


Past Surgical History: Yes: AICD, CABG (in 2013), Cholecystectomy, Hernia 

Repair (right groin), Valve Replacement (in 2013)





- Advance Directives


Advance Directives: Yes: Health Care Proxy





- Smoking History


Smoking history: Unknown if ever smoked


Have you smoked in the past 12 months: No


If you are a former smoker, when did you quit?: 2013





- Alcohol/Substance Use


Hx Alcohol Use: No


History of Substance Use: reports: None





- Social History


ADL: Support Services


Occupation: retired 


History of Recent Travel: No





Home Medications





- Allergies


Allergies/Adverse Reactions: 


 Allergies











Allergy/AdvReac Type Severity Reaction Status Date / Time


 


No Known Allergies Allergy   Verified 01/06/18 07:15














- Home Medications


Home Medications: 


Ambulatory Orders





Pantoprazole Sodium [Protonix -] 20 mg PO DAILY 04/20/16 


Acetaminophen [Tylenol .Regular Strength -] 650 mg PO Q6H PRN #0 tablet 11/10/

16 


Albuterol 2.5/Ipratropium 0.5 [Duoneb -] 1 amp NEB Q6H PRN #0 amp 11/10/16 


Alprazolam [Xanax] 0.25 mg PO Q8H PRN #90 tablet MDD 3 11/10/16 


Amino Acids/Protein Hydrolys [Prostat Sugar-Free Packet -] 30 ml PO BID@0800,

1730 #60 packet 11/10/16 


Aspirin [ASA -] 81 mg PO DAILY #30 tab.chew 11/10/16 


Atorvastatin Ca [Lipitor] 10 mg PO HS #30 tablet 11/10/16 


Cefuroxime Axetil [Ceftin -] 500 mg PO BID #4 tablet 11/10/16 


Ferrous Sulfate [Ferosul] 300 mg PO DAILY #1 bottle 11/10/16 


Furosemide [Lasix -] 20 mg PO BID@0600,1400 #60 tablet 11/10/16 


Insulin Detemir [Levemir Flextouch] 5 unit SQ DAILY #1 syringe 11/10/16 


Isopropyl Alcohol [Alcoh-Wipe] 1 each  BID #1 box 11/10/16 


Lancets/Blood Glucose Strips [Fora [id number] Strp-Lnct] 1 each  BID #1 

combo..pkg 11/10/16 


Metformin HCl [Glucophage -] 500 mg PO BID@0700,1630 #60 tablet 11/10/16 


Metoprolol Succinate [Toprol XL -] 12.5 mg PO BID #60 tab.sr.24h 11/10/16 


Miscellaneous Medical Supply [Glucometer Device] 1 each [facility] BID #1 kit 11/10/16 


Multivitamins [Multivit ([facility] Formulary)] 1 tab PO DAILY #30 tab 11/10/16 


Prednisone [Deltasone -] 7.5 mg PO DAILY #30 tablet 11/10/16 


Sennosides [Senna -] 1 tab PO BID #60 tablet 11/10/16 


Tamsulosin HCl [Flomax -] 0.4 mg PO DAILY@0830 #30 cap.er.24h 11/10/16 











Family Disease History





- Family Disease History


Family Disease History: Other: Son (alive and well)





Physical Examination


Vital Signs: 


 Vital Signs











Temperature  100.0 F H  01/06/18 08:02


 


Pulse Rate  84   01/06/18 08:02


 


Respiratory Rate  22   01/06/18 08:02


 


Blood Pressure  120/60   01/06/18 08:02


 


O2 Sat by Pulse Oximetry (%)  100   01/06/18 08:02











Cardiovascular: Yes: S1, S2


Respiratory: Yes: Mechanically Ventilated


Gastrointestinal: Yes: Normal Bowel Sounds, Soft


Neurological: Yes: Unresponsive


Labs: 


 CBC, BMP





 01/06/18 05:50 





 01/06/18 05:50 











Problem List





- Problems


(1) Cardiac arrest


Assessment/Plan: 


ICU MONITORING


FOLLOW CE


CARDIO CONSULT


Code(s): I46.9 - CARDIAC ARREST, CAUSE UNSPECIFIED   





(2) Acute and chronic respiratory failure with hypercapnia


Assessment/Plan: 


NEBS


IV STEROIDS


PULM CONSULT


VENT SUPPORT


Code(s): J96.22 - ACUTE AND CHRONIC RESPIRATORY FAILURE WITH HYPERCAPNIA   





(3) Anoxia


Assessment/Plan: 


NEURO CONSULT


Code(s): R09.02 - HYPOXEMIA

## 2018-01-06 NOTE — CON.CARD
Cardiology Consult (text)





- Consultation


Consultation Note: 





Consultation Note: 


CC: hypoxia





hpi:  72 m hx cad s/p MI and cabg and bioAVR, htn, hld, ischemic cardiomyopathy

, prior VF cardiac arrest 2/2016 now s/p ICD ([place]), paroxysmal atrial 

flutter (brief episodes in setting of sepsis, no AC initiated), copd on home 02

, chronic resp failure s/p trach, dm, here with hypoxia/PEA arrest.  Per family 

pt had been doing well then yesterday trach collar deflated and pt was hypoxic.

  EMS called and when arrive pt in PEA arrest.  Pulse regained.  Currently 

maintaining bp.





Sees dr ray for cardio.





pmh: per hpi


psh:per hpi


social: former smoker, no etoh or illicits


fam: unknown





Ambulatory Orders


 Home Medications











 Medication  Instructions  Recorded


 


Pantoprazole Sodium [Protonix -] 20 mg PO DAILY 04/20/16


 


Acetaminophen [Tylenol .Regular 650 mg PO Q6H PRN #0 tablet 11/10/16





Strength -]  


 


Albuterol 2.5/Ipratropium 0.5 1 amp NEB Q6H PRN #0 amp 11/10/16





[Duoneb -]  


 


Alprazolam [Xanax] 0.25 mg PO Q8H PRN #90 tablet MDD 3 11/10/16


 


Amino Acids/Protein Hydrolys 30 ml PO BID@0800,1730 #60 packet 11/10/16





[Prostat Sugar-Free Packet -]  


 


Aspirin [ASA -] 81 mg PO DAILY #30 tab.chew 11/10/16


 


Atorvastatin Ca [Lipitor] 10 mg PO HS #30 tablet 11/10/16


 


Cefuroxime Axetil [Ceftin -] 500 mg PO BID #4 tablet 11/10/16


 


Ferrous Sulfate [Ferosul] 300 mg PO DAILY #1 bottle 11/10/16


 


Furosemide [Lasix -] 20 mg PO BID@0600,1400 #60 tablet 11/10/16


 


Insulin Detemir [Levemir Flextouch] 5 unit SQ DAILY #1 syringe 11/10/16


 


Isopropyl Alcohol [Alcoh-Wipe] 1 each [facility] BID #1 box 11/10/16


 


Lancets/Blood Glucose Strips [Fora 1 each MC BID #1 combo..pkg 11/10/16





[id number] Strp-Lnct]  


 


Metformin HCl [Glucophage -] 500 mg PO BID@0700,1630 #60 tablet 11/10/16


 


Metoprolol Succinate [Toprol XL -] 12.5 mg PO BID #60 tab.sr.24h 11/10/16


 


Miscellaneous Medical Supply 1 each [facility] BID #1 kit 11/10/16





[Glucometer Device]  


 


Multivitamins [Multivit ([facility] 1 tab PO DAILY #30 tab 11/10/16





Formulary)]  


 


Prednisone [Deltasone -] 7.5 mg PO DAILY #30 tablet 11/10/16


 


Sennosides [Senna -] 1 tab PO BID #60 tablet 11/10/16


 


Tamsulosin HCl [Flomax -] 0.4 mg PO DAILY@0830 #30 cap.er.24h 11/10/16











 Vital Signs











Temp  99.4 F   01/06/18 11:00


 


Pulse  80   01/06/18 11:00


 


Resp  18   01/06/18 11:00


 


BP  98/55   01/06/18 11:00


 


Pulse Ox  100   01/06/18 09:58








 Intake & Output











 01/05/18 01/05/18 01/06/18





 11:59 23:59 11:59


 


Output Total  60 


 


Balance  -60 


 


Weight  180 lb 


 


Output:   


 


  Urine  60 


 


    Lion  60 


 


Other:   


 


  Voiding Method  Indwelling Catheter Indwelling Catheter


 


  Height  5 ft 7 in 


 


  Body Mass Index (BMI)  28.1 


 


  Weight Measurement Method  Estimated by Staff 











NAD, calm, on mechanical ventilation


JVD flat, neck supple


diminished bs


RRR nl s1, s2. no m/r/g


+ bs soft, no distension


no le e/c/c


pos dp/pt


no jaundice, diaphoresis


sedated





 


 Laboratory Last Values











WBC  11.9 K/mm3 (4.0-10.0)  H  01/06/18  05:50    


 


RBC  4.30 M/mm3 (4.00-5.60)   01/06/18  05:50    


 


Hgb  10.7 GM/dL (11.7-16.9)  L  01/06/18  05:50    


 


Hct  33.9 % (35.4-49)  L  01/06/18  05:50    


 


MCV  78.7 fl (80-96)  L  01/06/18  05:50    


 


MCH  24.9 pg (25.7-33.7)  L  01/06/18  05:50    


 


MCHC  31.6 g/dl (32.0-35.9)  L  01/06/18  05:50    


 


RDW  17.9 % (11.9-15.9)  H  01/06/18  05:50    


 


Plt Count  200 K/MM3 (134-434)   01/06/18  05:50    


 


MPV  8.5 fl (7.5-11.1)   01/06/18  05:50    


 


Neutrophils %  84.5 % (42.8-82.8)  H D 01/06/18  05:50    


 


Neutrophils % (Manual)  56.6 % (42.8-82.8)   01/05/18  20:09    


 


Band Neutrophils %  0.0 %  01/05/18  20:09    


 


Lymphocytes %  8.4 % (8-40)  D 01/06/18  05:50    


 


Lymphocytes % (Manual)  28.3 % (8-40)   01/05/18  20:09    


 


Monocytes %  6.5 % (3.8-10.2)   01/06/18  05:50    


 


Monocytes % (Manual)  3 % (3.8-10.2)  L  01/05/18  20:09    


 


Eosinophils %  0.1 % (0-4.5)  D 01/06/18  05:50    


 


Eosinophils % (Manual)  0.0 % (0-4.5)   01/05/18  20:09    


 


Basophils %  0.5 % (0-2.0)   01/06/18  05:50    


 


Basophils % (Manual)  0.0 % (0-2.0)   01/05/18  20:09    


 


Myelocytes % (Man)  4 % (0-2)  H  01/05/18  20:09    


 


Metamyelocytes  2 % (0-2)   01/05/18  20:09    


 


PT with INR  14.70 SEC (9.98-11.88)  H  01/05/18  20:09    


 


INR  1.30  (0.82-1.09)  H D 01/05/18  20:09    


 


PTT (Actin FS)  > 400.0 SECONDS (26.9-34.4)  H  01/05/18  20:09    


 


Puncture Site  Right radial   01/06/18  06:30    


 


ABG pH  7.42  (7.35-7.45)  D 01/06/18  06:30    


 


ABG pCO2 at Pt Temp  44.6 mmHg (35-45)  D 01/06/18  06:30    


 


ABG pO2 at Pt Temp  162.0 mmHg ()  H* D 01/06/18  06:30    


 


ABG HCO3  28.4 meq/L (22-26)  H  01/06/18  06:30    


 


ABG O2 Sat (Measured)  99.5 % (90-98.9)  H  01/06/18  06:30    


 


ABG O2 Content  15.1 % vol (15-22)   01/06/18  06:30    


 


ABG Base Excess  3.9 meq/l (-2-2)  H  01/06/18  06:30    


 


Jamel Test  Positive   01/06/18  06:30    


 


Carboxyhemoglobin  1.0 gm% (0.5-2.0)   01/05/18  20:15    


 


Methemoglobin  0.7 % (0.4-1.5)   01/05/18  20:15    


 


O2 Delivery Device  Mech vent   01/06/18  06:30    


 


Oxygen Flow Rate  50%   01/06/18  06:30    


 


Vent Mode  A/c   01/06/18  06:30    


 


Vent Rate  24   01/06/18  06:30    


 


Mechanical Rate  Yes   01/06/18  06:30    


 


PEEP  5.0 cmH2O  01/06/18  06:30    


 


Pressure Support Vent  450   01/06/18  06:30    


 


Sodium  141 mmol/L (136-145)   01/06/18  05:50    


 


Potassium  3.8 mmol/L (3.5-5.1)   01/06/18  05:50    


 


Chloride  101 mmol/L ()   01/06/18  05:50    


 


Carbon Dioxide  30 mmol/L (21-32)   01/06/18  05:50    


 


Anion Gap  10  (8-16)   01/06/18  05:50    


 


BUN  29 mg/dL (7-18)  H  01/06/18  05:50    


 


Creatinine  0.9 mg/dL (0.7-1.3)  D 01/06/18  05:50    


 


Creat Clearance w eGFR  > 60  (>60)   01/06/18  05:50    


 


Random Glucose  118 mg/dL ()  H D 01/06/18  05:50    


 


Hemoglobin A1c %  5.7 % (4.8-6.0)  D 01/06/18  05:50    


 


Lactic Acid  1.2 mmol/L (0.4-2.0)   01/06/18  08:30    


 


Calcium  9.2 mg/dL (8.5-10.1)   01/06/18  05:50    


 


Phosphorus  6.8 mg/dL (2.5-4.9)  H D 01/05/18  20:09    


 


Magnesium  1.8 mg/dL (1.8-2.4)   01/05/18  20:09    


 


Total Bilirubin  0.7 mg/dL (0.2-1.0)  D 01/06/18  05:50    


 


AST  137 U/L (15-37)  H D 01/06/18  05:50    


 


ALT  213 U/L (12-78)  H  01/06/18  05:50    


 


Alkaline Phosphatase  68 U/L ()   01/06/18  05:50    


 


Creatine Kinase  72 IU/L ()   01/06/18  03:25    


 


Troponin I  0.39 ng/ml (0.00-0.05)  H  01/06/18  08:30    


 


B-Natriuretic Peptide  1026.58 pg/ml (5-125)  H  01/05/18  20:09    


 


Total Protein  5.7 g/dl (6.4-8.2)  L  01/06/18  05:50    


 


Albumin  3.1 g/dl (3.4-5.0)  L  01/06/18  05:50    








ecg 1/5/18:  sr, pvcs, nl intervals, twi ant/lat, no st changes





cxr: clear lungs





CXR: small right pleural effusion with underlying atelectasis vs. early 

infiltrate.





mibi 9/2015:  no ischemia, large nilson apical and septal scars, lvef 38%





mibi 12/2012:  large anteroapical, apical , inferior, septal scars, cannot 

exclude mild septal ischemia, lvef mildly reduced





Echo 1/16: very TDS; severely decr LVEF, can't assess RWMAs; RV tds; valve fxn 

WNL





Cath 2/2016 MSH: 3vd with patent LIMA-LAD and SVG-RCA





est cct 35 mins








a/p:  72 m hx cad s/p MI and cabg and bioAVR, htn, hld, ischemic cardiomyopathy

, prior VF cardiac arrest 2/2016 now s/p ICD ([place]), paroxysmal atrial 

flutter (brief episodes in setting of sepsis, no AC initiated), copd on home 02

, chronic resp failure s/p trach, dm, here with hypoxia/PEA arrest.





pea arrest:


-based on hx seems pt's trach became dislodged and he became hypoxic which led 

to PEA arrest


-no signs acs or chf


-cont ICU care


-check echo


-monitor tele


-Patient has ICD ([employer]) and will arrange for interrogation but this device is 

meant for VT/VF and would not treat PEA.





hypotension:


-bp mildly low


-likely due to cardiac arrest


-has not required pressors, monitor for now





COPD/chronic resp failure:


-s/p trach, with mechanical ventilation requirement





cad s/p mi, cabg:


- no signs acs-->trop borderline elevated with flat trend and nl ck, not c/w acs


- ecg with new twis, likely from cardiac arrest, monitor with repeat ecg


- no obstructive dz at cath 2/16


- on statin, asa 





ischemic cardiomyopathy with h/o VF s/p ICD


- holding BB, ACE while acutely hypotensive


- family denies recent ICD shocks, has remote monitoring being done via EP at 

[place]


- electrolyte repletion prn. 





parox atrial flutter on prior admission with sepsis


- episodes were brief and patient was not placed on long term anticoagulation.  

monitor tele for now


 


s/p bioAVR


-done in 2013 at time of CABG


-TDS echo in past (not helpful for valve fxn)-->will check updated echo here

## 2018-01-06 NOTE — PROC
Central Line Insertion


Indication: CVP Monitoring, Vasopressor


Risks and Benefits Explained: Yes


Consent on Chart: Yes


Central Line: Triple Lumen Catheter


Anesthesia: 1% Lidocaine


Sterile Technique: Yes


Ultrasound Guided Assistance: Yes


Position: Right Internal Jugular


Post Insertion: Yes: Bilateral  Breath Sounds, Bilateral Chest Expansion, Chest 

X-Ray Ordered


Sterile Dressing Applied: Yes


Remarks: 





Pt tolerated the procedure well. No Complications to report.

## 2018-01-06 NOTE — CONSULT
Consult


Consult Specialty:: PULM/CCM


Referred by:: Dr. Apoorva Garcia


Reason for Consultation:: A on C Resp Fail S/p Cardiac Arrest





- History of Present Illness


Chief Complaint: Anoxic Brain Injury


History of Present Illness: 


Mr. Bass is a 73 y/o man w/ HTN, HLD, COPD, Trach, VDRF, uncontrolled DM2, MI 

s/p CABG w/ Aortic valve replacement in 2013, s/p PPM, AICD (2016), BIBA today 

in cardiac arrest, CPR in progress. Family reports pt desaturated & then 

cardiac arrest in the setting of routine trach cuff inflate/deflate adjustment. 

Family dialed 911. A/p EMS: Pt found down uncon/unresp in cardiac arrest, en 

route pt received 2 Epi, 1 dose Amio, and 1 amp bicarb. Pt received in the ED 

in PEA --> 2 CaCl, 1 amp D50, 10 units Ins, 3 Epi, --> ROSC achieved @ 0740 w/o 

mental status, pt started on Hep drip. In ED: Labs significant for WBC 12, K 3.3

, rand glucose 463 ( en route to hospital), gap 13, , , Tn 

0.07, BNP 1026, INR 1.30. Pt admitted to the ICU now s/p cardiac arrest w/ A on 

C resp Fail, MSOF, & anoxic brain injury. 





- History Source


History Provided By: Medical Record


Limitations to Obtaining History: Clinical Condition





- Past Medical History


Cardio/Vascular: Yes: Aortic Insufficiency, CAD (MI in 2013 -> CABG ), HTN, 

Hyperlipdemia, MI, Other (s/p aortic valve replacement in 2013)


Pulmonary: Yes: COPD, O2 Dependent (since 2013), Other (trache and vent 

dependent)





- Past Surgical History


Past Surgical History: Yes: AICD, CABG (in 2013), Cholecystectomy, Hernia 

Repair (right groin), Valve Replacement (in 2013)





- Alcohol/Substance Use


Hx Alcohol Use: No


History of Substance Use: reports: None





- Smoking History


Smoking history: Unknown if ever smoked


Have you smoked in the past 12 months: No


If you are a former smoker, when did you quit?: 2013





- Social History


Usual Living Arrangement: Other ([facility])


ADL: Support Services


Occupation: retired 


History of Recent Travel: No





Home Medications





- Allergies


Allergies/Adverse Reactions: 


 Allergies











Allergy/AdvReac Type Severity Reaction Status Date / Time


 


No Known Allergies Allergy   Verified 01/06/18 07:15














- Home Medications


Home Medications: 


Ambulatory Orders





Pantoprazole Sodium [Protonix -] 20 mg PO DAILY 04/20/16 


Acetaminophen [Tylenol .Regular Strength -] 650 mg PO Q6H PRN #0 tablet 11/10/

16 


Albuterol 2.5/Ipratropium 0.5 [Duoneb -] 1 amp NEB Q6H PRN #0 amp 11/10/16 


Alprazolam [Xanax] 0.25 mg PO Q8H PRN #90 tablet MDD 3 11/10/16 


Amino Acids/Protein Hydrolys [Prostat Sugar-Free Packet -] 30 ml PO BID@0800,

1730 #60 packet 11/10/16 


Aspirin [ASA -] 81 mg PO DAILY #30 tab.chew 11/10/16 


Atorvastatin Ca [Lipitor] 10 mg PO HS #30 tablet 11/10/16 


Cefuroxime Axetil [Ceftin -] 500 mg PO BID #4 tablet 11/10/16 


Ferrous Sulfate [Ferosul] 300 mg PO DAILY #1 bottle 11/10/16 


Furosemide [Lasix -] 20 mg PO BID@0600,1400 #60 tablet 11/10/16 


Insulin Detemir [Levemir Flextouch] 5 unit SQ DAILY #1 syringe 11/10/16 


Isopropyl Alcohol [Alcoh-Wipe] 1 each  BID #1 box 11/10/16 


Lancets/Blood Glucose Strips [Fora [id number]-M31 Strp-Lnct] 1 each  BID #1 

combo..pkg 11/10/16 


Metformin HCl [Glucophage -] 500 mg PO BID@0700,1630 #60 tablet 11/10/16 


Metoprolol Succinate [Toprol XL -] 12.5 mg PO BID #60 tab.sr.24h 11/10/16 


Miscellaneous Medical Supply [Glucometer Device] 1 each [facility] [place]1 kit 11/10/16 


Multivitamins [Multivit ([facility] Formulary)] 1 tab PO DAILY #30 tab 11/10/16 


Prednisone [Deltasone -] 7.5 mg PO DAILY #30 tablet 11/10/16 


Sennosides [Senna -] 1 tab PO BID #60 tablet 11/10/16 


Tamsulosin HCl [Flomax -] 0.4 mg PO DAILY@0830 #30 cap.er.24h 11/10/16 











Family Disease History





- Family Disease History


Family History: Unable to Obtain (UNCON/UNRESP on the VENT)


Family Disease History: Other: Son (alive and well)





Review of Systems


Unable to obtain ROS, reason: UNCON/UNRESP on the VENT





Physical Exam


Vital Signs: 


 Vital Signs











Temperature  99.4 F   01/06/18 16:00


 


Pulse Rate  78   01/06/18 16:00


 


Respiratory Rate  24   01/06/18 19:05


 


Blood Pressure  125/78   01/06/18 16:00


 


O2 Sat by Pulse Oximetry (%)  100   01/06/18 16:00








 Intake & Output











 01/04/18 01/05/18 01/06/18 01/07/18





 23:59 23:59 23:59 23:59


 


Output Total  60 750 


 


Balance  -60 -750 


 


Weight  81.647 kg 81.647 kg 











Constitutional: Yes: Cachectic, Poor Hygeine, Thin


Eyes: Yes: Other (NO REACTION. NO CORNEAL REFLEXES.)


HENT: Yes: WNL, Atraumatic, Normocephalic


Neck: Yes: WNL, Supple, Trachea Midline


Cardiovascular: Yes: WNL


Respiratory: Yes: Intubated, Mechanically Ventilated


Gastrointestinal: Yes: Soft, Hypoactive Bowel Sounds


...Rectal Exam: Yes: Deferred


Renal/: Yes: WNL


Breast(s): Yes: WNL


Musculoskeletal: Yes: WNL


Extremities: Yes: WNL


Edema: No


Peripheral Pulses WNL: Yes


Integumentary: Yes: WNL


Neurological: Yes: Unresponsive


...Motor Strength: WNL


Psychiatric: Yes: WNL


Labs: 


 CBC, BMP





 01/06/18 05:50 





 01/06/18 05:50 





 ABG Results











ABG pH  7.36  (7.35-7.45)   01/06/18  23:30    


 


ABG pCO2 at Pt Temp  44.3 mmHg (35-45)  D 01/06/18  23:30    


 


ABG pO2 at Pt Temp  152.0 mmHg ()  H*  01/06/18  23:30    


 


ABG HCO3  24.3 meq/L (22-26)   01/06/18  23:30    


 


ABG O2 Sat (Measured)  99.3 % (90-98.9)  H  01/06/18  23:30    


 


ABG O2 Content  15.1 % vol (15-22)   01/06/18  23:30    


 


ABG Base Excess  -0.7 meq/l (-2-2)   01/06/18  23:30    








 Troponin, BNP











  01/06/18 01/06/18





  03:25 08:30


 


Troponin I  0.49 H D  0.39 H








 Microbiology





01/05/18 22:52   Blood - Peripheral Venous   Blood Culture - Preliminary


                            NO GROWTH OBTAINED AFTER 24 HOURS, INCUBATION TO 

CONTINUE


                            FOR 4 DAYS.


01/05/18 22:52   Blood - Peripheral Venous   Blood Culture - Preliminary


                            NO GROWTH OBTAINED AFTER 24 HOURS, INCUBATION TO 

CONTINUE


                            FOR 4 DAYS.











Imaging





- Results


X-ray: Image Reviewed (1/5: Sternotomy wires, otherwise clear (My Read).)


EKG: Image Reviewed (12 LEAD 1/5: RSR in the 60's w/ occ PVCs, QRS is wide @ 

142ms, L axis deviation, intraventricular block, qu'ed out in II, III, & AVF c/

f inferior infarction, flipped T's in V1, V2, V3, V4, V5, & V6 c/f 

anterolateral ischemia, QTc 444ms, Post Cardiac Arrest EKG *** THIS PATIENT HAS 

JUST HAD AN MI**** (My Read).)





Problem List





- Problems


(1) Cardiac arrest


Code(s): I46.9 - CARDIAC ARREST, CAUSE UNSPECIFIED   





(2) Acute and chronic respiratory failure with hypercapnia


Code(s): J96.22 - ACUTE AND CHRONIC RESPIRATORY FAILURE WITH HYPERCAPNIA   





(3) Anoxia


Code(s): R09.02 - HYPOXEMIA   





(4) CAD (coronary artery disease)


Code(s): I25.10 - ATHSCL HEART DISEASE OF NATIVE CORONARY ARTERY W/O ANG PCTRS 

  





(5) COPD (chronic obstructive pulmonary disease)


Code(s): J44.9 - CHRONIC OBSTRUCTIVE PULMONARY DISEASE, UNSPECIFIED   


Qualifiers: 


   Emphysema type: unspecified 





(6) Elevated LFTs


Code(s): R79.89 - OTHER SPECIFIED ABNORMAL FINDINGS OF BLOOD CHEMISTRY   





(7) Ventilator dependence


Code(s): Z99.11 - DEPENDENCE ON RESPIRATOR [VENTILATOR] STATUS   





(8) Uncontrolled diabetes mellitus


Code(s): E11.65 - TYPE 2 DIABETES MELLITUS WITH HYPERGLYCEMIA   


Qualifiers: 


   Diabetes mellitus type: type 2 





(9) Respiratory failure


Code(s): J96.90 - RESPIRATORY FAILURE, UNSP, UNSP W HYPOXIA OR HYPERCAPNIA   


Qualifiers: 


   Chronicity: acute on chronic 





Assessment/Plan


ASSESS: This is a 73 y/o man w/ a PMHx/o: MI(2013), s/p Aortic Valve Replacement

(2013), CABG, HTN, HLD, COPD, Trach, VDRF. Admitted to ICU for s/p Cardiac 

Arrest, Acute Respiratory Failure, Hypercapnia. 





PLAN:


-NPO for now


-Vent Support


-Nebs


-Wean FiO2 as tolerated


-Pan Clxr


-Surveillance swabs


-U Ags


-Abx


-ID


-D/c all anti-HTN meds


-Trend Cardiac enzymes


-Trend LA


-Heparin gtt


-ASA


-Place CVC


-Trend CVP


-Press for a MAP of 65


-Continue Lipitor


-CARDS Consult


-Strict I's & O's


-Trend BUN/Cr


-Replete e-lytes prn


-FSs


-Insulin prn


-PPI


-SCDs


-Pt is moribund --> Stat PAll Care Consult


-Family meeting





DGL ACN-[facility] ICU


PULM/CCM


[id number]





Critical Care Time/MDM Note


Total Critical Care Time: 40


Critical Care Statement: The care of this patient involved high complexity 

decision making to prevent further life threatening deterioration of the patient

's condition and/or to evaluate & treat vital organ system(s) failure or risk 

of failure.

## 2018-01-06 NOTE — EKG
Test Reason : 

Blood Pressure : ***/*** mmHG

Vent. Rate : 068 BPM     Atrial Rate : 068 BPM

   P-R Int : 176 ms          QRS Dur : 142 ms

    QT Int : 418 ms       P-R-T Axes : 088 -66 088 degrees

   QTc Int : 444 ms

 

*** POOR DATA QUALITY, INTERPRETATION MAY BE ADVERSELY AFFECTED

SINUS RHYTHM WITH OCCASIONAL PREMATURE VENTRICULAR COMPLEXES

WITH 1ST DEGREE A-V BLOCK

LEFT AXIS DEVIATION

NON-SPECIFIC INTRA-VENTRICULAR CONDUCTION BLOCK

INFERIOR INFARCT , AGE UNDETERMINED

MARKED T WAVE ABNORMALITY, CONSIDER ANTEROLATERAL ISCHEMIA

ABNORMAL ECG

Confirmed by MARIAN MCCLURE MD ([id number]) on 1/6/2018 1:28:10 PM

 

Referred By:             Confirmed By:MARIAN MCCLURE MD

## 2018-01-06 NOTE — PROC
Procedure Note


Procedure: 





[facility] ICU A-LINE INSERTION NOTE:





INDICATION: Imminent hemodynamic instability in the setting of increasing 

sedation requirements & required frequent ABGs.





CONSENT: Consent was secured from pt's Son Fredrick Bass via telephone.





PROCEDURE: L Hand. Jamel's test confirmed good arterial blood flow in both the 

radial & ulnar arteries. The L wrist was secured in position, prepped, & draped 

in the usual sterile fashion. 





*********** TIME OUT ***********





The site was anesthetized w/ 3cc of 1% Lidocaine. A 20G needle was inserted 

into the L radial artery on the first attempt w/ pulsating bright red blood 

return. A 20G catheter was inserted into the artery via the Seldinger technique 

& the arterial line was connected. A good tracing & arterial waveform was 

confirmed. The catheter was secured in place w/ 2.0 Nylon sutures X2 and a 

sterile dressing was applied. Post procedure re-eval confirmed a pink warm L 

hand cap-refill < 2sec in all 5 R hand digits. Pt tolerated the procedure well.





Brandon Benton, ACNP-BC


[id number]


[facility] PULM / CCM

## 2018-01-07 LAB
ALBUMIN SERPL-MCNC: 2.9 G/DL (ref 3.4–5)
ALP SERPL-CCNC: 69 U/L (ref 45–117)
ALT SERPL-CCNC: 141 U/L (ref 12–78)
ANION GAP SERPL CALC-SCNC: 11 MMOL/L (ref 8–16)
AST SERPL-CCNC: 44 U/L (ref 15–37)
BILIRUB SERPL-MCNC: 0.8 MG/DL (ref 0.2–1)
BUN SERPL-MCNC: 33 MG/DL (ref 7–18)
CALCIUM SERPL-MCNC: 8.2 MG/DL (ref 8.5–10.1)
CHLORIDE SERPL-SCNC: 102 MMOL/L (ref 98–107)
CO2 SERPL-SCNC: 26 MMOL/L (ref 21–32)
CREAT SERPL-MCNC: 1.1 MG/DL (ref 0.7–1.3)
DEPRECATED RDW RBC AUTO: 18.1 % (ref 11.9–15.9)
GLUCOSE SERPL-MCNC: 165 MG/DL (ref 74–106)
HCT VFR BLD CALC: 33.2 % (ref 35.4–49)
HGB BLD-MCNC: 10.5 GM/DL (ref 11.7–16.9)
MCH RBC QN AUTO: 25.2 PG (ref 25.7–33.7)
MCHC RBC AUTO-ENTMCNC: 31.7 G/DL (ref 32–35.9)
MCV RBC: 79.6 FL (ref 80–96)
PLATELET # BLD AUTO: 223 K/MM3 (ref 134–434)
PMV BLD: 8.8 FL (ref 7.5–11.1)
POTASSIUM SERPLBLD-SCNC: 4.1 MMOL/L (ref 3.5–5.1)
PROT SERPL-MCNC: 5.7 G/DL (ref 6.4–8.2)
RBC # BLD AUTO: 4.18 M/MM3 (ref 4–5.6)
SODIUM SERPL-SCNC: 139 MMOL/L (ref 136–145)
WBC # BLD AUTO: 17 K/MM3 (ref 4–10)

## 2018-01-07 RX ADMIN — IPRATROPIUM BROMIDE AND ALBUTEROL SULFATE PRN AMP: .5; 3 SOLUTION RESPIRATORY (INHALATION) at 06:01

## 2018-01-07 RX ADMIN — SODIUM CHLORIDE SCH MLS/HR: 9 INJECTION, SOLUTION INTRAVENOUS at 10:43

## 2018-01-07 RX ADMIN — MUPIROCIN SCH APPLIC: 20 OINTMENT TOPICAL at 21:58

## 2018-01-07 RX ADMIN — METHYLPREDNISOLONE SODIUM SUCCINATE SCH MG: 125 INJECTION, POWDER, FOR SOLUTION INTRAMUSCULAR; INTRAVENOUS at 02:00

## 2018-01-07 RX ADMIN — CHLORHEXIDINE GLUCONATE SCH APPLIC: 213 SOLUTION TOPICAL at 21:58

## 2018-01-07 RX ADMIN — PROPOFOL SCH MLS/HR: 10 INJECTION, EMULSION INTRAVENOUS at 19:44

## 2018-01-07 RX ADMIN — METHYLPREDNISOLONE SODIUM SUCCINATE SCH MG: 125 INJECTION, POWDER, FOR SOLUTION INTRAMUSCULAR; INTRAVENOUS at 17:58

## 2018-01-07 RX ADMIN — ATORVASTATIN CALCIUM SCH MG: 10 TABLET, FILM COATED ORAL at 21:58

## 2018-01-07 RX ADMIN — IPRATROPIUM BROMIDE AND ALBUTEROL SULFATE PRN AMP: .5; 3 SOLUTION RESPIRATORY (INHALATION) at 11:35

## 2018-01-07 RX ADMIN — METHYLPREDNISOLONE SODIUM SUCCINATE SCH MG: 125 INJECTION, POWDER, FOR SOLUTION INTRAMUSCULAR; INTRAVENOUS at 10:42

## 2018-01-07 RX ADMIN — IPRATROPIUM BROMIDE AND ALBUTEROL SULFATE PRN AMP: .5; 3 SOLUTION RESPIRATORY (INHALATION) at 00:00

## 2018-01-07 RX ADMIN — MUPIROCIN SCH APPLIC: 20 OINTMENT TOPICAL at 10:43

## 2018-01-07 RX ADMIN — MUPIROCIN SCH APPLIC: 20 OINTMENT TOPICAL at 07:01

## 2018-01-07 RX ADMIN — SODIUM CHLORIDE SCH MLS/HR: 9 INJECTION, SOLUTION INTRAVENOUS at 05:00

## 2018-01-07 RX ADMIN — ASPIRIN 81 MG SCH MG: 81 TABLET ORAL at 10:42

## 2018-01-07 NOTE — PN
Progress Note (short form)





- Note


Progress Note: 


s:  sedated on vent.  on levo briefly overnight.  anoxic brain injury per Neuro 

eval





o:


 Vital Signs











Temp  97.5 F L  01/07/18 10:00


 


Pulse  78   01/07/18 11:00


 


Resp  20   01/07/18 11:00


 


BP  98/62   01/07/18 11:00


 


Pulse Ox  100   01/06/18 20:00








 Intake & Output











 01/06/18 01/06/18 01/07/18





 11:59 23:59 11:59


 


Intake Total   1175


 


Output Total  750 200


 


Balance  -750 975


 


Weight  180 lb 


 


Intake:   


 


  IV   1175


 


    DIPRIVAN - 1,000,000 mcg   25





    In 100 ml @ 5 MCG/KG/MIN   





    2.449 mls/hr IVPB TITR   





    DOROTHY Rx#:[id number]   


 


    Levophed - 8,000 Mcg In   150





    D5w - 492 ml @ 0.03 MCG/   





    KG/MIN 9.18 mls/hr IV   





    ASDIR DOROTHY Rx#:[id number]   


 


    Normal Saline - 1,000 ml   1000





    @ 100 mls/hr IV ASDIR DOROTHY   





    Rx#:[id number]   


 


Output:   


 


  Urine  750 200


 


    Lion  750 200


 


Other:   


 


  Voiding Method Indwelling Catheter Indwelling Catheter 


 


  Bowel Movement   Yes


 


  # Bowel Movements   2


 


  Height  5 ft 7 in 


 


  Body Mass Index (BMI)  28.1 








NAD, calm, on mechanical ventilation


JVD flat, neck supple


diminished bs


RRR nl s1, s2. no m/r/g


+ bs soft, no distension


no le e/c/c


pos dp/pt


no jaundice, diaphoresis


sedated





 


 


 Current Medications











Generic Name Dose Route Start Last Admin





  Trade Name Freq  PRN Reason Stop Dose Admin


 


Albuterol/Ipratropium  1 amp  01/06/18 17:06  01/07/18 06:01





  Duoneb -  NEB   1 amp





  Q30M PRN   Administration





  SHORTNESS OF BREATH   


 


Aspirin  81 mg  01/06/18 10:00  01/07/18 10:42





  Asa -  NGT   81 mg





  DAILY DOROTHY   Administration


 


Atorvastatin Calcium  10 mg  01/06/18 22:00  01/06/18 22:00





  Lipitor -  NGT   Not Given





  HS DOROTHY   


 


Chlorhexidine Gluconate  1 applic  01/05/18 22:00  01/06/18 22:00





  Hibiclens For Decolonization -  TP   1 applic





  HS DOROTHY   Administration


 


Sodium Chloride  1,000 mls @ 100 mls/hr  01/06/18 06:00  01/07/18 05:00





  Normal Saline -  IV   100 mls/hr





  ASDIR DOROTHY   Administration


 


Propofol  1,000,000 mcg in 100 mls @ 2.449 mls/hr  01/06/18 17:45  01/06/18 18:

43





  Diprivan -  IVPB   5 mcg/kg/min





  TITR DOROTHY   2.449 mls/hr





  Protocol   Administration





  5 MCG/KG/MIN   


 


Fentanyl 500 mcg/ Dextrose  100 mls @ 20 mls/hr  01/06/18 23:15  01/07/18 07:17





  IVPB   Not Given





  TITR DOROTHY   





  100 MCG/HR   


 


Midazolam HCl 100 mg/ Sodium  100 mls @ 6 mls/hr  01/06/18 23:15  01/07/18 07:17





  Chloride  IVPB   Not Given





  TITR DOROTHY   





  Protocol   





  6 MG/HR   


 


Norepinephrine Bitartrate 8,  500 mls @ 9.18 mls/hr  01/07/18 06:45  01/06/18 23

:45





  000 mcg/ Dextrose  IV   0.03 mcg/kg/min





  ASDIR DOROTHY   9.18 mls/hr





  Protocol   Administration





  0.03 MCG/KG/MIN   


 


Methylprednisolone Sodium Succinate  60 mg  01/06/18 18:00  01/07/18 10:42





  Solu-Medrol -  IVPB   60 mg





  Q8H-IV DOROTHY   Administration


 


Mupirocin  1 applic  01/05/18 22:00  01/07/18 10:43





  Bactroban Ointment (For Decolonization) -  NS  01/10/18 21:59  1 applic





  BID DOROTHY   Administration








 Laboratory Last Values











WBC  17.0 K/mm3 (4.0-10.0)  H D 01/07/18  05:25    


 


RBC  4.18 M/mm3 (4.00-5.60)   01/07/18  05:25    


 


Hgb  10.5 GM/dL (11.7-16.9)  L  01/07/18  05:25    


 


Hct  33.2 % (35.4-49)  L  01/07/18  05:25    


 


MCV  79.6 fl (80-96)  L  01/07/18  05:25    


 


MCH  25.2 pg (25.7-33.7)  L  01/07/18  05:25    


 


MCHC  31.7 g/dl (32.0-35.9)  L  01/07/18  05:25    


 


RDW  18.1 % (11.9-15.9)  H  01/07/18  05:25    


 


Plt Count  223 K/MM3 (134-434)   01/07/18  05:25    


 


MPV  8.8 fl (7.5-11.1)   01/07/18  05:25    


 


Neutrophils %  84.5 % (42.8-82.8)  H D 01/06/18  05:50    


 


Neutrophils % (Manual)  56.6 % (42.8-82.8)   01/05/18  20:09    


 


Band Neutrophils %  0.0 %  01/05/18  20:09    


 


Lymphocytes %  8.4 % (8-40)  D 01/06/18  05:50    


 


Lymphocytes % (Manual)  28.3 % (8-40)   01/05/18  20:09    


 


Monocytes %  6.5 % (3.8-10.2)   01/06/18  05:50    


 


Monocytes % (Manual)  3 % (3.8-10.2)  L  01/05/18  20:09    


 


Eosinophils %  0.1 % (0-4.5)  D 01/06/18  05:50    


 


Eosinophils % (Manual)  0.0 % (0-4.5)   01/05/18  20:09    


 


Basophils %  0.5 % (0-2.0)   01/06/18  05:50    


 


Basophils % (Manual)  0.0 % (0-2.0)   01/05/18  20:09    


 


Myelocytes % (Man)  4 % (0-2)  H  01/05/18  20:09    


 


Metamyelocytes  2 % (0-2)   01/05/18  20:09    


 


PT with INR  14.70 SEC (9.98-11.88)  H  01/05/18  20:09    


 


INR  1.30  (0.82-1.09)  H D 01/05/18  20:09    


 


PTT (Actin FS)  42.9 SECONDS (26.9-34.4)  H D 01/07/18  05:25    


 


Anticoagulation Therapy  No Result Required.   01/06/18  16:50    


 


Puncture Site  Arterial line   01/06/18  23:30    


 


ABG pH  7.36  (7.35-7.45)   01/06/18  23:30    


 


ABG pCO2 at Pt Temp  44.3 mmHg (35-45)  D 01/06/18  23:30    


 


ABG pO2 at Pt Temp  152.0 mmHg ()  H*  01/06/18  23:30    


 


ABG HCO3  24.3 meq/L (22-26)   01/06/18  23:30    


 


ABG O2 Sat (Measured)  99.3 % (90-98.9)  H  01/06/18  23:30    


 


ABG O2 Content  15.1 % vol (15-22)   01/06/18  23:30    


 


ABG Base Excess  -0.7 meq/l (-2-2)   01/06/18  23:30    


 


Jamel Test  Positive   01/06/18  23:30    


 


Carboxyhemoglobin  1.0 gm% (0.5-2.0)   01/05/18  20:15    


 


Methemoglobin  0.7 % (0.4-1.5)   01/05/18  20:15    


 


O2 Delivery Device  Mech vent   01/06/18  23:30    


 


Oxygen Flow Rate  50%   01/06/18  23:30    


 


Vent Mode  A/c   01/06/18  23:30    


 


Vent Rate  24   01/06/18  23:30    


 


Mechanical Rate  Yes   01/06/18  23:30    


 


PEEP  8.0 cmH2O  01/06/18  23:30    


 


Pressure Support Vent  450   01/06/18  23:30    


 


Sodium  139 mmol/L (136-145)   01/07/18  05:25    


 


Potassium  4.1 mmol/L (3.5-5.1)   01/07/18  05:25    


 


Chloride  102 mmol/L ()   01/07/18  05:25    


 


Carbon Dioxide  26 mmol/L (21-32)   01/07/18  05:25    


 


Anion Gap  11  (8-16)   01/07/18  05:25    


 


BUN  33 mg/dL (7-18)  H  01/07/18  05:25    


 


Creatinine  1.1 mg/dL (0.7-1.3)  D 01/07/18  05:25    


 


Creat Clearance w eGFR  > 60  (>60)   01/07/18  05:25    


 


Random Glucose  165 mg/dL ()  H D 01/07/18  05:25    


 


Hemoglobin A1c %  5.7 % (4.8-6.0)  D 01/06/18  05:50    


 


Lactic Acid  1.2 mmol/L (0.4-2.0)   01/06/18  08:30    


 


Calcium  8.2 mg/dL (8.5-10.1)  L  01/07/18  05:25    


 


Phosphorus  6.8 mg/dL (2.5-4.9)  H D 01/05/18  20:09    


 


Magnesium  1.8 mg/dL (1.8-2.4)   01/05/18  20:09    


 


Total Bilirubin  0.8 mg/dL (0.2-1.0)   01/07/18  05:25    


 


AST  44 U/L (15-37)  H D 01/07/18  05:25    


 


ALT  141 U/L (12-78)  H D 01/07/18  05:25    


 


Alkaline Phosphatase  69 U/L ()   01/07/18  05:25    


 


Creatine Kinase  72 IU/L ()   01/06/18  03:25    


 


Troponin I  0.39 ng/ml (0.00-0.05)  H  01/06/18  08:30    


 


B-Natriuretic Peptide  1026.58 pg/ml (5-125)  H  01/05/18  20:09    


 


Total Protein  5.7 g/dl (6.4-8.2)  L  01/07/18  05:25    


 


Albumin  2.9 g/dl (3.4-5.0)  L  01/07/18  05:25    








tele:  sr





ecg 1/5/18:  sr, pvcs, nl intervals, twi ant/lat, no st changes





cxr: clear lungs





CXR: small right pleural effusion with underlying atelectasis vs. early 

infiltrate.





mibi 9/2015:  no ischemia, large nilson apical and septal scars, lvef 38%





mibi 12/2012:  large anteroapical, apical , inferior, septal scars, cannot 

exclude mild septal ischemia, lvef mildly reduced





Echo 1/16: very TDS; severely decr LVEF, can't assess RWMAs; RV tds; valve fxn 

WNL





Cath 2/2016 MSH: 3vd with patent LIMA-LAD and SVG-RCA





est cct 35 mins








a/p:  72 m hx cad s/p MI and cabg and bioAVR, htn, hld, ischemic cardiomyopathy

, prior VF cardiac arrest 2/2016 now s/p ICD ([place]), paroxysmal atrial 

flutter (brief episodes in setting of sepsis, no AC initiated), copd on home 02

, chronic resp failure s/p trach, dm, here with hypoxia/PEA arrest.





pea arrest:


-based on hx seems pt's trach became dislodged and he became hypoxic which led 

to PEA arrest


-no signs acs (no need for hep gtt from cardiac pov) or chf


-cont ICU care


-check echo


-monitor tele


-Patient has ICD ([employer]) with routine remote checks. Interrogation can be done 

here but it seems he had PEA and not vt/vf.


-anoxic brain injury per Neuro eval, likely poor prognosis





hypotension:


-bp mildly low


-likely due to cardiac arrest


-required levo briefly, now off





COPD/chronic resp failure:


-s/p trach, with mechanical ventilation requirement





cad s/p mi, cabg:


- no signs acs-->trop borderline elevated with flat trend and nl ck, not c/w acs


- ecg with new twis, likely from cardiac arrest, monitor with repeat ecg


- no obstructive dz at cath 2/16


- on statin, asa 





ischemic cardiomyopathy with h/o VF s/p ICD


- holding BB, ACE while acutely hypotensive


- family denies recent ICD shocks, has remote monitoring being done via EP at 

[place]


- electrolyte repletion prn. 





parox atrial flutter on prior admission with sepsis


- episodes were brief and patient was not placed on long term anticoagulation.  

monitor tele for now


 


s/p bioAVR


-done in 2013 at time of CABG


-TDS echo in past (not helpful for valve fxn)-->will check updated echo here

## 2018-01-07 NOTE — PN
Progress Note (short form)





- Note


Progress Note: 


PULM/CCM





Pt seen & Examined in the ICU. TLC & A-Line O/N. Briefly on pressors. (

Appreciate O/N Provider). Pt is sedated intubated on the vent. NEURO confirms 

anoxic brain injury.





Active MEDs





Albuterol/Ipratropium (Duoneb -)  1 amp NEB Q30M PRN


   PRN Reason: SHORTNESS OF BREATH


   Last Admin: 01/07/18 06:01 Dose:  1 amp


Aspirin (Asa -)  81 mg NGT DAILY DOROTHY


   Last Admin: 01/06/18 10:16 Dose:  81 mg


Atorvastatin Calcium (Lipitor -)  10 mg NGT HS DOROTHY


   Last Admin: 01/06/18 22:00 Dose:  Not Given


Chlorhexidine Gluconate (Hibiclens For Decolonization -)  1 applic TP HS DOROTHY


   Last Admin: 01/06/18 22:00 Dose:  1 applic


Heparin Sodium (Porcine) 25, (000 unit/ Sodium Chloride)  500 mls @ 20 mls/hr 

IV TITR DOROTHY; 1,000 UNIT/HR


   PRN Reason: Protocol


   Last Admin: 01/06/18 23:00 Dose:  1,000 unit/hr, 20 mls/hr


Sodium Chloride (Normal Saline -)  1,000 mls @ 100 mls/hr IV ASDIR DOROTHY


   Last Admin: 01/07/18 05:00 Dose:  100 mls/hr


Propofol (Diprivan -)  1,000,000 mcg in 100 mls @ 2.449 mls/hr IVPB TITR DOROTHY; 5 

MCG/KG/MIN


   PRN Reason: Protocol


   Last Admin: 01/06/18 18:43 Dose:  5 mcg/kg/min, 2.449 mls/hr


Fentanyl 500 mcg/ Dextrose  100 mls @ 20 mls/hr IVPB TITR DOROTHY


   PRN Reason: 100 MCG/HR


   Last Admin: 01/07/18 07:17 Dose:  Not Given


Midazolam HCl 100 mg/ Sodium (Chloride)  100 mls @ 6 mls/hr IVPB TITR DOROTHY; 6 MG/

HR


   PRN Reason: Protocol


   Last Admin: 01/07/18 07:17 Dose:  Not Given


Norepinephrine Bitartrate 8, (000 mcg/ Dextrose)  500 mls @ 9.18 mls/hr IV 

ASDIR DOROTHY; 0.03 MCG/KG/MIN


   PRN Reason: Protocol


   Last Admin: 01/06/18 23:45 Dose:  0.03 mcg/kg/min, 9.18 mls/hr


Methylprednisolone Sodium Succinate (Solu-Medrol -)  60 mg IVPB Q8H-IV DOROTHY


   Last Admin: 01/07/18 02:00 Dose:  60 mg


Mupirocin (Bactroban Ointment (For Decolonization) -)  1 applic NS BID DOROTHY


   Stop: 01/10/18 21:59


   Last Admin: 01/07/18 07:01 Dose:  1 applic





 V/S











 Period  Temp  Pulse  Resp  BP Sys/Regalado  Pulse Ox


 


 Last 24 Hr  96.4 F-99.4 F  64-96  12-25  /54-96  100-100








 Intake & Output











 01/04/18 01/05/18 01/06/18 01/07/18





 23:59 23:59 23:59 23:59


 


Intake Total    1175


 


Output Total  60 750 200


 


Balance  -60 -750 975


 


Weight  81.647 kg 81.647 kg 








GEN: Elderly man, trached, debilitated, VDRF


HEENT: Pupils 3mm, no rxn, an-icteric, MMM, trach is midline & patent


PULM: CTAB


CV: nml S1 S2, RR, unable to appreciate any G/M/R


ABD: + BS, S/S N/T N/D X4Q


EXT: + Pulses, WWPX4, (-) edema








 CBC, BMP





 01/07/18 05:25 





 01/07/18 05:25 





 Microbiology





01/05/18 22:52   Blood - Peripheral Venous   Blood Culture - Preliminary


                            NO GROWTH OBTAINED AFTER 24 HOURS, INCUBATION TO 

CONTINUE


                            FOR 4 DAYS.


01/05/18 22:52   Blood - Peripheral Venous   Blood Culture - Preliminary


                            NO GROWTH OBTAINED AFTER 24 HOURS, INCUBATION TO 

CONTINUE


                            FOR 4 DAYS.





RECENT STUDIES TO NOTE:





CXR 1/6: Image Reviewed (1/5: Sternotomy wires, otherwise clear (My Read).)





EKG 1/5: Image Reviewed (12 LEAD 1/5: RSR in the 60's w/ occ PVCs, QRS is wide 

@ 142ms, L axis deviation, intraventricular block, qu'ed out in II, III, & AVF c

/f inferior infarction, flipped T's in V1, V2, V3, V4, V5, & V6 c/f 

anterolateral ischemia, QTc 444ms, Post Cardiac Arrest EKG *** THIS PATIENT HAS 

JUST HAD AN MI**** (My Read).)





ASSESS: This is a 73 y/o man w/ a PMHx/o: MI(2013), s/p Aortic Valve Replacement

(2013), CABG, HTN, HLD, COPD, Trach, VDRF. Admitted to the ICU now w/ anoxic 

brain injury 2/2 Cardiac Arrest in the setting of acute Hypercapnic Respiratory 

Failure. 





PLAN:


-NPO for now


-Vent Support


-Nebs


-Wean Steroids


-Wean FiO2 as tolerated


-F/u Lo Clxr


-F/u Surveillance swabs


-F/u U Ags


-Abx


-ID


-Hold all anti-HTN meds


-Trend Cardiac enzymes


-Trend LA


-Heparin gtt


-ASA


-Trend CVP


-Press for a MAP of 65


-Continue Lipitor


-CARDS Consult


-Strict I's & O's


-Trend BUN/Cr


-Replete e-lytes prn


-FSs


-Insulin prn


-PPI


-SCDs


-Pt is moribund --> Stat PAll Care Consult


-Family meeting





CCT 39"





DG [facility]N-[facility] ICU


PULM/CCM


[id number]





Critical Care


Total Critical Care Time (in minutes): 39


Critical Care Statement: The care of this patient involved high complexity 

decision making to prevent further life threatening deterioration of the patient

's condition and/or to evaluate & treat vital organ system(s) failure or risk 

of failure.





Problem List





- Problems


(1) Cardiac arrest


Code(s): I46.9 - CARDIAC ARREST, CAUSE UNSPECIFIED   





(2) Acute and chronic respiratory failure with hypercapnia


Code(s): J96.22 - ACUTE AND CHRONIC RESPIRATORY FAILURE WITH HYPERCAPNIA   





(3) Anoxia


Code(s): R09.02 - HYPOXEMIA   





(4) CAD (coronary artery disease)


Code(s): I25.10 - ATHSCL HEART DISEASE OF NATIVE CORONARY ARTERY W/O ANG PCTRS 

  





(5) COPD (chronic obstructive pulmonary disease)


Code(s): J44.9 - CHRONIC OBSTRUCTIVE PULMONARY DISEASE, UNSPECIFIED   


Qualifiers: 


   Emphysema type: unspecified 





(6) Elevated LFTs


Code(s): R79.89 - OTHER SPECIFIED ABNORMAL FINDINGS OF BLOOD CHEMISTRY   





(7) Ventilator dependence


Code(s): Z99.11 - DEPENDENCE ON RESPIRATOR [VENTILATOR] STATUS   





(8) Uncontrolled diabetes mellitus


Code(s): E11.65 - TYPE 2 DIABETES MELLITUS WITH HYPERGLYCEMIA   


Qualifiers: 


   Diabetes mellitus type: type 2 





(9) Respiratory failure


Code(s): J96.90 - RESPIRATORY FAILURE, UNSP, UNSP W HYPOXIA OR HYPERCAPNIA   


Qualifiers: 


   Chronicity: acute on chronic

## 2018-01-07 NOTE — PN
Progress Note, Physician





- Current Medication List


Current Medications: 


Active Medications





Albuterol/Ipratropium (Duoneb -)  1 amp NEB Q30M PRN


   PRN Reason: SHORTNESS OF BREATH


   Last Admin: 01/07/18 06:01 Dose:  1 amp


Aspirin (Asa -)  81 mg NGT DAILY [facility]


   Last Admin: 01/07/18 10:42 Dose:  81 mg


Atorvastatin Calcium (Lipitor -)  10 mg NGT HS [facility]


   Last Admin: 01/06/18 22:00 Dose:  Not Given


Chlorhexidine Gluconate (Hibiclens For Decolonization -)  1 applic TP HS [facility]


   Last Admin: 01/06/18 22:00 Dose:  1 applic


Sodium Chloride (Normal Saline -)  1,000 mls @ 100 mls/hr IV ASDIR DOROTHY


   Last Admin: 01/07/18 05:00 Dose:  100 mls/hr


Propofol (Diprivan -)  1,000,000 mcg in 100 mls @ 2.449 mls/hr IVPB TITR DOROTHY; 5 

MCG/KG/MIN


   PRN Reason: Protocol


   Last Admin: 01/06/18 18:43 Dose:  5 mcg/kg/min, 2.449 mls/hr


Fentanyl 500 mcg/ Dextrose  100 mls @ 20 mls/hr IVPB TITR DOROTHY


   PRN Reason: 100 MCG/HR


   Last Admin: 01/07/18 07:17 Dose:  Not Given


Midazolam HCl 100 mg/ Sodium (Chloride)  100 mls @ 6 mls/hr IVPB TITR DOROTHY; 6 MG/

HR


   PRN Reason: Protocol


   Last Admin: 01/07/18 07:17 Dose:  Not Given


Norepinephrine Bitartrate 8, (000 mcg/ Dextrose)  500 mls @ 9.18 mls/hr IV 

ASDIR DOROTHY; 0.03 MCG/KG/MIN


   PRN Reason: Protocol


   Last Admin: 01/06/18 23:45 Dose:  0.03 mcg/kg/min, 9.18 mls/hr


Methylprednisolone Sodium Succinate (Solu-Medrol -)  60 mg IVPB Q8H-IV DOROTHY


   Last Admin: 01/07/18 10:42 Dose:  60 mg


Mupirocin (Bactroban Ointment (For Decolonization) -)  1 applic NS BID DOROTHY


   Stop: 01/10/18 21:59


   Last Admin: 01/07/18 10:43 Dose:  1 applic











- Objective


Vital Signs: 


 Vital Signs











Temperature  97.5 F L  01/07/18 10:00


 


Pulse Rate  78   01/07/18 11:00


 


Respiratory Rate  20   01/07/18 11:00


 


Blood Pressure  98/62   01/07/18 11:00


 


O2 Sat by Pulse Oximetry (%)  100   01/06/18 20:00











Cardiovascular: Yes: S1, S2


Respiratory: Yes: Mechanically Ventilated


Gastrointestinal: Yes: Normal Bowel Sounds, Soft


Neurological: Yes: Unresponsive


Labs: 


 CBC, BMP





 01/07/18 05:25 





 01/07/18 05:25 





 INR, PTT











INR  1.30  (0.82-1.09)  H D 01/05/18  20:09    














Problem List





- Problems


(1) Cardiac arrest


Assessment/Plan: 


ICU MONITORING


FOLLOW CE


CARDIO CONSULT


[facility]


PRESSERS


Code(s): I46.9 - CARDIAC ARREST, CAUSE UNSPECIFIED   





(2) Acute and chronic respiratory failure with hypercapnia


Assessment/Plan: 


NEBS


IV STEROIDS


PULM CONSULT


VENT SUPPORT


ON PROPOPHOL


Code(s): J96.22 - ACUTE AND CHRONIC RESPIRATORY FAILURE WITH HYPERCAPNIA   





(3) Anoxia


Assessment/Plan: 


NEURO CONSULT TO ASSES ANOXIA





Code(s): R09.02 - HYPOXEMIA

## 2018-01-08 LAB
ALBUMIN SERPL-MCNC: 3 G/DL (ref 3.4–5)
ALP SERPL-CCNC: 69 U/L (ref 45–117)
ALT SERPL-CCNC: 94 U/L (ref 12–78)
ANION GAP SERPL CALC-SCNC: 11 MMOL/L (ref 8–16)
ARTERIAL BLOOD GAS PCO2: 38.5 MMHG (ref 35–45)
ARTERIAL PATENCY WRIST A: (no result)
AST SERPL-CCNC: 26 U/L (ref 15–37)
BASE EXCESS BLDA CALC-SCNC: 1.7 MEQ/L (ref -2–2)
BILIRUB SERPL-MCNC: 0.8 MG/DL (ref 0.2–1)
BUN SERPL-MCNC: 40 MG/DL (ref 7–18)
CALCIUM SERPL-MCNC: 8.2 MG/DL (ref 8.5–10.1)
CHLORIDE SERPL-SCNC: 103 MMOL/L (ref 98–107)
CO2 SERPL-SCNC: 27 MMOL/L (ref 21–32)
CREAT SERPL-MCNC: 1.3 MG/DL (ref 0.7–1.3)
DEPRECATED RDW RBC AUTO: 18 % (ref 11.9–15.9)
GLUCOSE SERPL-MCNC: 167 MG/DL (ref 74–106)
HCT VFR BLD CALC: 32.4 % (ref 35.4–49)
HGB BLD-MCNC: 10 GM/DL (ref 11.7–16.9)
MCH RBC QN AUTO: 24.2 PG (ref 25.7–33.7)
MCHC RBC AUTO-ENTMCNC: 30.9 G/DL (ref 32–35.9)
MCV RBC: 78.4 FL (ref 80–96)
PLATELET # BLD AUTO: 183 K/MM3 (ref 134–434)
PMV BLD: 8.1 FL (ref 7.5–11.1)
PO2 BLDA: 85.4 MMHG (ref 70–100)
POTASSIUM SERPLBLD-SCNC: 4.1 MMOL/L (ref 3.5–5.1)
PROT SERPL-MCNC: 6 G/DL (ref 6.4–8.2)
RBC # BLD AUTO: 4.13 M/MM3 (ref 4–5.6)
SAO2 % BLDA: 96.8 % (ref 90–98.9)
SODIUM SERPL-SCNC: 141 MMOL/L (ref 136–145)
WBC # BLD AUTO: 10.5 K/MM3 (ref 4–10)

## 2018-01-08 RX ADMIN — CHLORHEXIDINE GLUCONATE SCH APPLIC: 213 SOLUTION TOPICAL at 21:17

## 2018-01-08 RX ADMIN — MUPIROCIN SCH APPLIC: 20 OINTMENT TOPICAL at 10:00

## 2018-01-08 RX ADMIN — METHYLPREDNISOLONE SODIUM SUCCINATE SCH MG: 125 INJECTION, POWDER, FOR SOLUTION INTRAMUSCULAR; INTRAVENOUS at 18:50

## 2018-01-08 RX ADMIN — ASPIRIN 81 MG SCH MG: 81 TABLET ORAL at 10:00

## 2018-01-08 RX ADMIN — SODIUM CHLORIDE SCH MLS/HR: 9 INJECTION, SOLUTION INTRAVENOUS at 12:00

## 2018-01-08 RX ADMIN — ATORVASTATIN CALCIUM SCH MG: 10 TABLET, FILM COATED ORAL at 21:05

## 2018-01-08 RX ADMIN — DEXTROSE MONOHYDRATE SCH: 50 INJECTION, SOLUTION INTRAVENOUS at 18:49

## 2018-01-08 RX ADMIN — PROPOFOL SCH MLS/HR: 10 INJECTION, EMULSION INTRAVENOUS at 16:20

## 2018-01-08 RX ADMIN — METHYLPREDNISOLONE SODIUM SUCCINATE SCH MG: 125 INJECTION, POWDER, FOR SOLUTION INTRAMUSCULAR; INTRAVENOUS at 10:00

## 2018-01-08 RX ADMIN — MUPIROCIN SCH APPLIC: 20 OINTMENT TOPICAL at 21:17

## 2018-01-08 RX ADMIN — METHYLPREDNISOLONE SODIUM SUCCINATE SCH MG: 125 INJECTION, POWDER, FOR SOLUTION INTRAMUSCULAR; INTRAVENOUS at 02:00

## 2018-01-08 NOTE — PN
Teaching Attending Note


Name of Resident: Latoya León





ATTENDING PHYSICIAN STATEMENT





I saw and evaluated the patient.


I reviewed the resident's note and discussed the case with the resident.


I agree with the resident's findings and plan as documented.








SUBJECTIVE:


Patient seen and examined in the ICU.  Remains poorly responsive on AC mode of 

vent.  


NE for hemodynamic support.  


Grimaces to pain, does not follow commands.  Pupils unequal but reactive (right 

> left). 





 Intake & Output











 01/05/18 01/06/18 01/07/18 01/08/18





 23:59 23:59 23:59 23:59


 


Intake Total   3366 1030


 


Output Total 60 750 1400 300


 


Balance -60 -750 1966 730


 


Weight 180 lb 180 lb  








 Last Vital Signs











Temp Pulse Resp BP Pulse Ox


 


 98.8 F   85   16   165/88   99 


 


 01/08/18 10:00  01/08/18 10:00  01/08/18 10:00  01/08/18 10:00  01/08/18 09:38








Active Medications





Albuterol/Ipratropium (Duoneb -)  1 amp NEB Q30M PRN


   PRN Reason: SHORTNESS OF BREATH


   Last Admin: 01/07/18 11:35 Dose:  1 amp


Aspirin (Asa -)  81 mg NGT DAILY DOROTHY


   Last Admin: 01/08/18 10:00 Dose:  81 mg


Atorvastatin Calcium (Lipitor -)  10 mg NGT HS DOROTHY


   Last Admin: 01/07/18 21:58 Dose:  10 mg


Chlorhexidine Gluconate (Hibiclens For Decolonization -)  1 applic TP HS DOROTHY


   Last Admin: 01/07/18 21:58 Dose:  1 applic


Sodium Chloride (Normal Saline -)  1,000 mls @ 100 mls/hr IV ASDIR DOROTHY


   Last Admin: 01/07/18 05:00 Dose:  100 mls/hr


Propofol (Diprivan -)  1,000,000 mcg in 100 mls @ 2.449 mls/hr IVPB TITR DOROTHY; 5 

MCG/KG/MIN


   PRN Reason: Protocol


   Last Admin: 01/07/18 19:44 Dose:  5 mcg/kg/min, 2.449 mls/hr


Norepinephrine Bitartrate 8, (000 mcg/ Dextrose)  500 mls @ 9.18 mls/hr IV 

ASDIR DOROTHY; 0.03 MCG/KG/MIN


   PRN Reason: Protocol


   Last Admin: 01/06/18 23:45 Dose:  0.03 mcg/kg/min, 9.18 mls/hr


Methylprednisolone Sodium Succinate (Solu-Medrol -)  60 mg IVPB Q8H-IV DOROTHY


   Last Admin: 01/08/18 10:00 Dose:  60 mg


Mupirocin (Bactroban Ointment (For Decolonization) -)  1 applic NS BID DOROTHY


   Stop: 01/10/18 21:59


   Last Admin: 01/08/18 10:00 Dose:  1 applic














GEN: Poorly responsive, Trached / vented 


HEENT: Pupils Right 3mm/Left 2 mm, reactive, Trach midline and intact 


PULM: Few rhonchi 


CV: nml S1 S2, RR 


ABD: (+) BS, ND


EXT: + Pulses, [id number], (-) edema


 Laboratory Results - last 24 hr











  01/08/18 01/08/18 01/08/18





  05:20 05:20 05:20


 


WBC  10.5 H D  


 


RBC  4.13  


 


Hgb  10.0 L  


 


Hct  32.4 L  


 


MCV  78.4 L  


 


MCH  24.2 L  


 


MCHC  30.9 L  


 


RDW  18.0 H  


 


Plt Count  183  


 


MPV  8.1  


 


PTT (Actin FS)    23.4 L D


 


Puncture Site   


 


ABG pH   


 


ABG pCO2 at Pt Temp   


 


ABG pO2 at Pt Temp   


 


ABG HCO3   


 


ABG O2 Sat (Measured)   


 


ABG O2 Content   


 


ABG Base Excess   


 


Jamel Test   


 


Oxygen Flow Rate   


 


Vent Mode   


 


Vent Rate   


 


Mechanical Rate   


 


PEEP   


 


Pressure Support Vent   


 


Sodium   141 


 


Potassium   4.1 


 


Chloride   103 


 


Carbon Dioxide   27 


 


Anion Gap   11 


 


BUN   40 H D 


 


Creatinine   1.3 


 


Creat Clearance w eGFR   54.42 


 


Random Glucose   167 H 


 


Calcium   8.2 L 


 


Total Bilirubin   0.8 


 


AST   26  D 


 


ALT   94 H D 


 


Alkaline Phosphatase   69 


 


Total Protein   6.0 L 


 


Albumin   3.0 L 














  01/08/18





  09:20


 


WBC 


 


RBC 


 


Hgb 


 


Hct 


 


MCV 


 


MCH 


 


MCHC 


 


RDW 


 


Plt Count 


 


MPV 


 


PTT (Actin FS) 


 


Puncture Site  No Result Required.


 


ABG pH  7.44


 


ABG pCO2 at Pt Temp  38.5


 


ABG pO2 at Pt Temp  85.4  D


 


ABG HCO3  25.4


 


ABG O2 Sat (Measured)  96.8


 


ABG O2 Content  13.1 L


 


ABG Base Excess  1.7


 


Jamel Test  Not applicable


 


Oxygen Flow Rate  30


 


Vent Mode  A/c


 


Vent Rate  20


 


Mechanical Rate  Esput


 


PEEP  8.0


 


Pressure Support Vent  450


 


Sodium 


 


Potassium 


 


Chloride 


 


Carbon Dioxide 


 


Anion Gap 


 


BUN 


 


Creatinine 


 


Creat Clearance w eGFR 


 


Random Glucose 


 


Calcium 


 


Total Bilirubin 


 


AST 


 


ALT 


 


Alkaline Phosphatase 


 


Total Protein 


 


Albumin 











Problem List





- Problems


(1) Cardiac arrest


Code(s): I46.9 - CARDIAC ARREST, CAUSE UNSPECIFIED   





(2) Acute and chronic respiratory failure with hypercapnia


Code(s): J96.22 - ACUTE AND CHRONIC RESPIRATORY FAILURE WITH HYPERCAPNIA   





(3) Anoxia


Code(s): R09.02 - HYPOXEMIA   





(4) CAD (coronary artery disease)


Code(s): I25.10 - ATHSCL HEART DISEASE OF NATIVE CORONARY ARTERY W/O ANG PCTRS 

  





(5) COPD (chronic obstructive pulmonary disease)


Code(s): J44.9 - CHRONIC OBSTRUCTIVE PULMONARY DISEASE, UNSPECIFIED   


Qualifiers: 


   Emphysema type: unspecified 





(6) Elevated LFTs


Code(s): R79.89 - OTHER SPECIFIED ABNORMAL FINDINGS OF BLOOD CHEMISTRY   





(7) Ventilator dependence


Code(s): Z99.11 - DEPENDENCE ON RESPIRATOR [VENTILATOR] STATUS   





(8) Uncontrolled diabetes mellitus


Code(s): E11.65 - TYPE 2 DIABETES MELLITUS WITH HYPERGLYCEMIA   


Qualifiers: 


   Diabetes mellitus type: type 2 





(9) Respiratory failure


Code(s): J96.90 - RESPIRATORY FAILURE, UNSP, UNSP W HYPOXIA OR HYPERCAPNIA   


Qualifiers: 


   Chronicity: acute on chronic 








ASSESS: 


CP Arrest with resulting EYAD 


AMI (2013)


S/P Aortic Valve Replacement (2013)


CABG


HTN 


HPL 


COPD: S/P Trach











PLAN:


BD TX 


Short steroid taper 


Follow cultures 


Follow Neuro exam 


Wean pressors 


ASA 


Lipitor 


Overall prognosis appears poor 








Dr Shelton 


Critical care time spent in reviewing chart, evaluating patient and formulating 

plan - 40 minutes.

## 2018-01-08 NOTE — PN
Progress Note, Physician


Chief Complaint: 





s/p cardiac arrest 


seen in icu


low dose of propofol


off the presors 


on the vent on AC mode





- Current Medication List


Current Medications: 


Active Medications





Albuterol/Ipratropium (Duoneb -)  1 amp NEB Q30M PRN


   PRN Reason: SHORTNESS OF BREATH


   Last Admin: 01/07/18 11:35 Dose:  1 amp


Aspirin (Asa -)  81 mg NGT DAILY DOROTHY


   Last Admin: 01/08/18 10:00 Dose:  81 mg


Atorvastatin Calcium (Lipitor -)  10 mg NGT HS DOROTHY


   Last Admin: 01/07/18 21:58 Dose:  10 mg


Chlorhexidine Gluconate (Hibiclens For Decolonization -)  1 applic TP HS DOROTHY


   Last Admin: 01/07/18 21:58 Dose:  1 applic


Sodium Chloride (Normal Saline -)  1,000 mls @ 100 mls/hr IV ASDIR DOROTHY


   Last Admin: 01/07/18 05:00 Dose:  100 mls/hr


Propofol (Diprivan -)  1,000,000 mcg in 100 mls @ 2.449 mls/hr IVPB TITR DOROTHY; 5 

MCG/KG/MIN


   PRN Reason: Protocol


   Last Admin: 01/07/18 19:44 Dose:  5 mcg/kg/min, 2.449 mls/hr


Norepinephrine Bitartrate 8, (000 mcg/ Dextrose)  500 mls @ 9.18 mls/hr IV 

ASDIR DOROTHY; 0.03 MCG/KG/MIN


   PRN Reason: Protocol


   Last Admin: 01/06/18 23:45 Dose:  0.03 mcg/kg/min, 9.18 mls/hr


Methylprednisolone Sodium Succinate (Solu-Medrol -)  60 mg IVPB Q8H-IV DOROTHY


   Last Admin: 01/08/18 10:00 Dose:  60 mg


Mupirocin (Bactroban Ointment (For Decolonization) -)  1 applic NS BID DOROTHY


   Stop: 01/10/18 21:59


   Last Admin: 01/08/18 10:00 Dose:  1 applic











- Objective


Vital Signs: 


 Vital Signs











Temperature  98.8 F   01/08/18 10:00


 


Pulse Rate  85   01/08/18 10:00


 


Respiratory Rate  18   01/08/18 14:49


 


Blood Pressure  155/82   01/08/18 12:00


 


O2 Sat by Pulse Oximetry (%)  99   01/08/18 09:38











Constitutional: Yes: Calm


HENT: Yes: Other ( ng tube intubated)


Cardiovascular: Yes: S1, S2


Respiratory: Yes: Mechanically Ventilated


Gastrointestinal: Yes: Soft


Labs: 


 CBC, BMP





 01/08/18 05:20 





 01/08/18 05:20 





 INR, PTT











INR  1.30  (0.82-1.09)  H D 01/05/18  20:09    














Problem List





- Problems


(1) Anoxia


Assessment/Plan: 


seen by neurology


intubated


Code(s): R09.02 - HYPOXEMIA   





(2) Cardiac arrest


Assessment/Plan: 


s/p resp failure intubated


dvt ppx


Code(s): I46.9 - CARDIAC ARREST, CAUSE UNSPECIFIED   





(3) CAD (coronary artery disease)


Assessment/Plan: 


asa,lipitor via ng tube


Code(s): I25.10 - ATHSCL HEART DISEASE OF NATIVE CORONARY ARTERY W/O ANG PCTRS

## 2018-01-08 NOTE — PN
Progress Note (short form)





- Note


Progress Note: 








NEUROLOGY FOLLOW-UP:





Events reviewed and discussed with Dr. Shelton and the ICU housestaff.


Pt is s/p cardiopulmonary arrest x 2 with resuscitation.


Pt remains poorly responsive OFF Propofol at this time.





++ spontatneous respirations.


No response to sternal pressure. (Dr. Shelton described decerebrate 

posturing on his exam.)


Right pupil 4mm  left 1 1/2 mm both react to light.


B/L corneals are present this AM


Full EOM's to Doll's head


No limb mov'ts.





IMP: Still with severe cerebral dysfunction


Although brainstem functions are clearly improved at this time.


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





SUGGEST: Cont. supportive care.


               Prognosis guarded.





Thank you very much,


Max Nixon MD

## 2018-01-08 NOTE — PN
Progress Note, Physician


Chief Complaint: 





s/p PEA arrest


History of Present Illness: 





intubated via trach.


not responsive, eyes open





- Current Medication List


Current Medications: 


Active Medications





Albuterol/Ipratropium (Duoneb -)  1 amp NEB Q30M PRN


   PRN Reason: SHORTNESS OF BREATH


   Last Admin: 01/07/18 11:35 Dose:  1 amp


Aspirin (Asa -)  81 mg NGT DAILY DOROTHY


   Last Admin: 01/07/18 10:42 Dose:  81 mg


Atorvastatin Calcium (Lipitor -)  10 mg NGT HS DOROTHY


   Last Admin: 01/07/18 21:58 Dose:  10 mg


Chlorhexidine Gluconate (Hibiclens For Decolonization -)  1 applic TP HS DOROTHY


   Last Admin: 01/07/18 21:58 Dose:  1 applic


Sodium Chloride (Normal Saline -)  1,000 mls @ 100 mls/hr IV ASDIR DOROTHY


   Last Admin: 01/07/18 05:00 Dose:  100 mls/hr


Propofol (Diprivan -)  1,000,000 mcg in 100 mls @ 2.449 mls/hr IVPB TITR DOROTHY; 5 

MCG/KG/MIN


   PRN Reason: Protocol


   Last Admin: 01/07/18 19:44 Dose:  5 mcg/kg/min, 2.449 mls/hr


Norepinephrine Bitartrate 8, (000 mcg/ Dextrose)  500 mls @ 9.18 mls/hr IV 

ASDIR DOROTHY; 0.03 MCG/KG/MIN


   PRN Reason: Protocol


   Last Admin: 01/06/18 23:45 Dose:  0.03 mcg/kg/min, 9.18 mls/hr


Methylprednisolone Sodium Succinate (Solu-Medrol -)  60 mg IVPB Q8H-IV DOROTHY


   Last Admin: 01/08/18 02:00 Dose:  60 mg


Mupirocin (Bactroban Ointment (For Decolonization) -)  1 applic NS BID DOROTHY


   Stop: 01/10/18 21:59


   Last Admin: 01/07/18 21:58 Dose:  1 applic











- Objective


Vital Signs: 


 Vital Signs











Temperature  97.7 F   01/08/18 05:38


 


Pulse Rate  63   01/08/18 08:00


 


Respiratory Rate  20   01/08/18 08:00


 


Blood Pressure  119/60   01/08/18 08:00


 


O2 Sat by Pulse Oximetry (%)  100   01/06/18 20:00











Constitutional: Yes: Well Nourished, No Distress, Calm


Cardiovascular: Yes: Regular Rate and Rhythm (decr intensity), S1, S2.  No: JVD 

(tds exam), Gallop, Murmur


Respiratory: Yes: Regular, CTA Bilaterally (anteriorly).  No: Accessory Muscle 

Use


Extremities: No: Cold


Edema: No


Neurological: No: Alert, Oriented


Psychiatric: No: Agitated


Labs: 


 CBC, BMP





 01/08/18 05:20 





 01/08/18 05:20 





 INR, PTT











INR  1.30  (0.82-1.09)  H D 01/05/18  20:09    














- ....Imaging


EKG: Other (tele: NSR.)





Assessment/Plan





ecg 1/5/18:  sr, pvcs, nl intervals, twi ant/lat, no st changes





cxr: clear lungs





CXR: small right pleural effusion with underlying atelectasis vs. early 

infiltrate.





mibi 9/2015:  no ischemia, large nilson apical and septal scars, lvef 38%





mibi 12/2012:  large anteroapical, apical , inferior, septal scars, cannot 

exclude mild septal ischemia, lvef mildly reduced





Echo 1/16: very TDS; severely decr LVEF, can't assess RWMAs; RV tds; valve fxn 

WNL





Cath 2/2016 MSH: 3vd with patent LIMA-LAD and SVG-RCA











a/p:  72 m hx cad s/p MI and cabg and bioAVR, htn, hld, ischemic cardiomyopathy

, prior VF cardiac arrest 2/2016 now s/p ICD ([place]), paroxysmal atrial 

flutter (brief episodes in setting of sepsis, no AC initiated), copd on home 02

, chronic resp failure s/p trach, dm, here with hypoxia/PEA arrest.





anoxic brain injury 2/2 PEA cardiac arrest in the setting of acute Hypercapnic 

Respiratory Failure:


-? trach dislodgement triggered sequence of events


-doubt acs here (troponins low/non-diagnostic range, nonspecific ECG changes 

obtained s/p hypoxia and cardiac arrest)


-no signs CHF 


-vent mgmt per critical care


-f/u echo (done this AM)


-Patient has ICD ([place]) with routine remote checks (via EP at [place])--if 

makes meaningful recovery, will f/u with EP regarding any detected arrhythmias 

at time of event


-monitor tele, as doing





HTN:


-currently hypertensive, 


-? secondary to increased ICP


-bp has been low transiently, mostly WNL over past 24 hrs


-defer meds for now, observe BP trend





COPD/chronic resp failure:


-s/p trach, with mechanical ventilation requirement





cad with h/o mi, cabg:


- ACS not likely here, as above


- no obstructive dz at cath 2/16


- on statin, asa 





ischemic cardiomyopathy with h/o VF s/p ICD


- held BB, ACE here while acutely hypotensive


- low threshold to resume if bp remains elevated/stable


- appears clinically euvolemic--no aggressive diuresis warranted


- has primary prevn ICD, monitor tele


- electrolyte repletion prn. 





parox atrial flutter on prior admission with sepsis


- episodes were brief and patient was not placed on long term anticoagulation.  


 


s/p bioAVR


-done in 2013 at time of CABG


-TDS echo in past (not helpful for valve fxn)-->will check updated echo here





est time spent in data review, pt exam, and formulating mgmt plan of 

potentially life-threatening illness = 35 min

## 2018-01-08 NOTE — PN
Physical Exam: 


SUBJECTIVE: Patient seen and examined.  Now grimacing to pain but still unable 

to follow commands.


Still on levophed and fluids. Off propofol this am.











OBJECTIVE:





 Vital Signs











 Period  Temp  Pulse  Resp  BP Sys/Regalado  Pulse Ox


 


 Last 24 Hr  96.9 F-98.6 F  63-78  18-20  /51-73  











GENERAL: The patient is non responsive except for grimacing to pain, on 

tracheostomy- VCV-AC- fi02-30%, TV-450, PEEP-8, RR-20.


HEAD: OGT in place


EYES: Sluggishly reacting pupils bilaterally,Unequal- Rt 4mm, L2mm, positive 

doll's eyes bilaterally 


ENT: tracheostomy tube in situ, dry moist mucous membranes, absent teeth.


NECK: tracheostomy tube in situ, RIJ in situ. 


LUNGS:Mechanical Breath sounds L> R, 


HEART: Regular rate and rhythm, S1, S2 , CVP-12


ABDOMEN: Soft,nondistended, bowel sounds present


EXTREMITIES:LUE peripheral line, RUE arterial line,   


NEUROLOGICAL: Grimacing to pain, positive gag reflex


PSYCH: Unable to assess


Lines:tracheostomy,RIJ, Lion, OGT, R arterial line, 














 Laboratory Results - last 24 hr











  01/08/18 01/08/18 01/08/18





  05:20 05:20 05:20


 


WBC  10.5 H D  


 


RBC  4.13  


 


Hgb  10.0 L  


 


Hct  32.4 L  


 


MCV  78.4 L  


 


MCH  24.2 L  


 


MCHC  30.9 L  


 


RDW  18.0 H  


 


Plt Count  183  


 


MPV  8.1  


 


PTT (Actin FS)    23.4 L D


 


Sodium   141 


 


Potassium   4.1 


 


Chloride   103 


 


Carbon Dioxide   27 


 


Anion Gap   11 


 


BUN   40 H D 


 


Creatinine   1.3 


 


Creat Clearance w eGFR   54.42 


 


Random Glucose   167 H 


 


Calcium   8.2 L 


 


Total Bilirubin   0.8 


 


AST   26  D 


 


ALT   94 H D 


 


Alkaline Phosphatase   69 


 


Total Protein   6.0 L 


 


Albumin   3.0 L 








Active Medications











Generic Name Dose Route Start Last Admin





  Trade Name Freq  PRN Reason Stop Dose Admin


 


Albuterol/Ipratropium  1 amp  01/06/18 17:06  01/07/18 11:35





  Duoneb -  NEB   1 amp





  Q30M PRN   Administration





  SHORTNESS OF BREATH   


 


Aspirin  81 mg  01/06/18 10:00  01/07/18 10:42





  Asa -  NGT   81 mg





  DAILY DOROTHY   Administration


 


Atorvastatin Calcium  10 mg  01/06/18 22:00  01/07/18 21:58





  Lipitor -  NGT   10 mg





  HS DOROTHY   Administration


 


Chlorhexidine Gluconate  1 applic  01/05/18 22:00  01/07/18 21:58





  Hibiclens For Decolonization -  TP   1 applic





  HS DOROTHY   Administration


 


Sodium Chloride  1,000 mls @ 100 mls/hr  01/06/18 06:00  01/07/18 05:00





  Normal Saline -  IV   100 mls/hr





  ASDIR DOROTHY   Administration


 


Propofol  1,000,000 mcg in 100 mls @ 2.449 mls/hr  01/06/18 17:45  01/07/18 19:

44





  Diprivan -  IVPB   5 mcg/kg/min





  TITR DOROTHY   2.449 mls/hr





  Protocol   Administration





  5 MCG/KG/MIN   


 


Norepinephrine Bitartrate 8,  500 mls @ 9.18 mls/hr  01/07/18 06:45  01/06/18 23

:45





  000 mcg/ Dextrose  IV   0.03 mcg/kg/min





  ASDIR DOROTHY   9.18 mls/hr





  Protocol   Administration





  0.03 MCG/KG/MIN   


 


Methylprednisolone Sodium Succinate  60 mg  01/06/18 18:00  01/08/18 02:00





  Solu-Medrol -  IVPB   60 mg





  Q8H-IV DOROTHY   Administration


 


Mupirocin  1 applic  01/05/18 22:00  01/07/18 21:58





  Bactroban Ointment (For Decolonization) -  NS  01/10/18 21:59  1 applic





  BID DOROTHY   Administration





Microbiology





01/05/18 22:52   Blood - Peripheral Venous   Blood Culture - Preliminary


                            NO GROWTH OBTAINED AFTER 48 HOURS, INCUBATION TO 

CONTINUE


                            FOR 3 DAYS.


01/05/18 22:52   Blood - Peripheral Venous   Blood Culture - Preliminary


                            NO GROWTH OBTAINED AFTER 48 HOURS, INCUBATION TO 

CONTINUE


                            FOR 3 DAYS.











ASSESSMENT/PLAN:


 71 y/o M w/ PMH tracheostomy (vent dependent x4 yrs from [facility]), COPD, DM2, MI 

s/p CABG with Ao valve replacement, s/p PPM, HTN, HLD previous hx of cardiac 

arrest (2016) with TTM, who was BIBEMS with hypoxia, s/p dislodged trach and 

cardiac arrest, likely PEA in the field and in ER, found to have anoxic brain 

injury.





Neuro:


   AMS 2/2 anoxic brain injury s/p cardiac arrest


   Seen by neurology- Dr Nixon


   Sedation vacation to monitor mental status


   Monitor


   D/W with [facility]





Cardio:


   CAD hx-s/p CABG with Ao valve replacement, s/p PPM


   HTN


   HLD


   Hypotension 


   Atorvastatin 10mg HS NGT


   Levophed at 9.18ml/hr- wean


   ASA 81mg NGT daily





Respiratory:


   Acute on Chronic hypoxic, hypercapnic respiratory failure


   COPD s/p tracheostomy and ventilator dependence (4years)


   to prevent air stacking on vent-RR reduced from 20 to 16


   ABGs


   Sedation vacation 


   Duonebs


   Iv methylprednisolone taper- from iv 60mg Q8hrly to iv 60mg 12hrly x 2days





Metab/Fluids/Lines:


   DM-


   BGMs


   ISS


   Monitor


   IV Normal saline @100ml/hr


   tracheostomy,RIJ, Lion, OGT, R arterial line


   CMP, Mg, Phosphorus


ID:


   Follow cultures- blood cultures- prelim negative after 48hrs





Prophylaxis:


   iv Protonix 40mg bid


   Bilateral SCDs





Dispo:


   Poor prognosis


   D/W [facility]- they recounted prior cardiac arrest 2 years ago, 

open to further discussions with palliative care


   Cont ICU mx on vent


   Palliative care- Amada Choudhury on board for family meeting today


   


























Visit type





- Emergency Visit


Emergency Visit: Yes


ED Registration Date: 01/05/18


Care time: The patient presented to the Emergency Department on the above date 

and was hospitalized for further evaluation of their emergent condition.





- New Patient


This patient is new to me today: Yes


Date on this admission: 01/08/18





- Critical Care


Critical Care patient: Yes


Total Critical Care Time (in minutes): 45


Critical Care Statement: The care of this patient involved high complexity 

decision making to prevent further life threatening deterioration of the patient

's condition and/or to evaluate & treat vital organ system(s) failure or risk 

of failure.

## 2018-01-09 LAB
ALBUMIN SERPL-MCNC: 3 G/DL (ref 3.4–5)
ALP SERPL-CCNC: 68 U/L (ref 45–117)
ALT SERPL-CCNC: 83 U/L (ref 12–78)
ANION GAP SERPL CALC-SCNC: 8 MMOL/L (ref 8–16)
ARTERIAL BLOOD GAS PCO2: 46.2 MMHG (ref 35–45)
ARTERIAL PATENCY WRIST A: POSITIVE
AST SERPL-CCNC: 28 U/L (ref 15–37)
BASE EXCESS BLDA CALC-SCNC: 1.7 MEQ/L (ref -2–2)
BASOPHILS # BLD: 0 % (ref 0–2)
BILIRUB SERPL-MCNC: 0.8 MG/DL (ref 0.2–1)
BUN SERPL-MCNC: 45 MG/DL (ref 7–18)
CALCIUM SERPL-MCNC: 8.5 MG/DL (ref 8.5–10.1)
CHLORIDE SERPL-SCNC: 107 MMOL/L (ref 98–107)
CO2 SERPL-SCNC: 29 MMOL/L (ref 21–32)
CREAT SERPL-MCNC: 1 MG/DL (ref 0.7–1.3)
DEPRECATED RDW RBC AUTO: 17.9 % (ref 11.9–15.9)
EOSINOPHIL # BLD: 0 % (ref 0–4.5)
GLUCOSE SERPL-MCNC: 173 MG/DL (ref 74–106)
HCT VFR BLD CALC: 32.6 % (ref 35.4–49)
HGB BLD-MCNC: 10.2 GM/DL (ref 11.7–16.9)
LYMPHOCYTES # BLD: 4.3 % (ref 8–40)
MAGNESIUM SERPL-MCNC: 2.3 MG/DL (ref 1.8–2.4)
MCH RBC QN AUTO: 24.7 PG (ref 25.7–33.7)
MCHC RBC AUTO-ENTMCNC: 31.3 G/DL (ref 32–35.9)
MCV RBC: 78.8 FL (ref 80–96)
MONOCYTES # BLD AUTO: 4.6 % (ref 3.8–10.2)
NEUTROPHILS # BLD: 91.1 % (ref 42.8–82.8)
PHOSPHATE SERPL-MCNC: 3.3 MG/DL (ref 2.5–4.9)
PLATELET # BLD AUTO: 220 K/MM3 (ref 134–434)
PMV BLD: 8.4 FL (ref 7.5–11.1)
PO2 BLDA: 86.3 MMHG (ref 70–100)
POTASSIUM SERPLBLD-SCNC: 4.4 MMOL/L (ref 3.5–5.1)
PROT SERPL-MCNC: 6 G/DL (ref 6.4–8.2)
RBC # BLD AUTO: 4.14 M/MM3 (ref 4–5.6)
SAO2 % BLDA: 96.7 % (ref 90–98.9)
SODIUM SERPL-SCNC: 144 MMOL/L (ref 136–145)
WBC # BLD AUTO: 7.8 K/MM3 (ref 4–10)

## 2018-01-09 RX ADMIN — METHYLPREDNISOLONE SODIUM SUCCINATE SCH MG: 125 INJECTION, POWDER, FOR SOLUTION INTRAMUSCULAR; INTRAVENOUS at 21:50

## 2018-01-09 RX ADMIN — ASPIRIN 81 MG SCH MG: 81 TABLET ORAL at 10:39

## 2018-01-09 RX ADMIN — DEXTROSE MONOHYDRATE SCH: 50 INJECTION, SOLUTION INTRAVENOUS at 06:46

## 2018-01-09 RX ADMIN — SODIUM CHLORIDE SCH MLS/HR: 9 INJECTION, SOLUTION INTRAVENOUS at 06:47

## 2018-01-09 RX ADMIN — ATORVASTATIN CALCIUM SCH MG: 10 TABLET, FILM COATED ORAL at 21:51

## 2018-01-09 RX ADMIN — IPRATROPIUM BROMIDE AND ALBUTEROL SULFATE PRN AMP: .5; 3 SOLUTION RESPIRATORY (INHALATION) at 05:35

## 2018-01-09 RX ADMIN — HEPARIN SODIUM SCH UNIT: 5000 INJECTION, SOLUTION INTRAVENOUS; SUBCUTANEOUS at 21:51

## 2018-01-09 RX ADMIN — METHYLPREDNISOLONE SODIUM SUCCINATE SCH MG: 125 INJECTION, POWDER, FOR SOLUTION INTRAMUSCULAR; INTRAVENOUS at 10:38

## 2018-01-09 RX ADMIN — MUPIROCIN SCH APPLIC: 20 OINTMENT TOPICAL at 10:39

## 2018-01-09 NOTE — PN
Progress Note, Physician


Chief Complaint: 





ON VENT SUPPORT


NOT RESPONDING TO STIMULI





- Current Medication List


Current Medications: 


Active Medications





Albuterol/Ipratropium (Duoneb -)  1 amp NEB Q30M PRN


   PRN Reason: SHORTNESS OF BREATH


   Last Admin: 01/09/18 05:35 Dose:  1 amp


Aspirin (Asa -)  81 mg NGT DAILY DOROTHY


   Last Admin: 01/09/18 10:39 Dose:  81 mg


Atorvastatin Calcium (Lipitor -)  10 mg NGT HS DOROTHY


   Last Admin: 01/08/18 21:05 Dose:  10 mg


Heparin Sodium (Porcine) (Heparin -)  5,000 unit SQ TID DOROTHY


Sodium Chloride (Normal Saline -)  1,000 mls @ 100 mls/hr IV ASDIR DOROTHY


   Last Admin: 01/09/18 06:47 Dose:  100 mls/hr


Propofol (Diprivan -)  1,000,000 mcg in 100 mls @ 2.449 mls/hr IVPB TITR DOROTHY; 5 

MCG/KG/MIN


   PRN Reason: Protocol


   Last Admin: 01/08/18 16:20 Dose:  5 mcg/kg/min, 2.449 mls/hr


Norepinephrine Bitartrate 8, (000 mcg/ Dextrose)  500 mls @ 9.18 mls/hr IV 

ASDIR DOROTHY; 0.03 MCG/KG/MIN


   PRN Reason: Protocol


   Last Admin: 01/09/18 06:46 Dose:  Not Given


Methylprednisolone Sodium Succinate (Solu-Medrol -)  60 mg IVPB BID DOROTHY


   Last Admin: 01/09/18 10:38 Dose:  60 mg











- Objective


Vital Signs: 


 Vital Signs











Temperature  98.4 F   01/09/18 04:00


 


Pulse Rate  60   01/09/18 14:00


 


Respiratory Rate  18   01/09/18 15:18


 


Blood Pressure  141/69   01/09/18 14:00


 


O2 Sat by Pulse Oximetry (%)  98   01/08/18 20:25











Constitutional: Yes: Moderate Distress


Eyes: Yes: Other


HENT: Yes: WNL


Neck: Yes: WNL


Cardiovascular: Yes: WNL


Respiratory: Yes: Mechanically Ventilated


Gastrointestinal: Yes: WNL


Genitourinary: Yes: Lion Present


Musculoskeletal: Yes: Muscle Weakness


Extremities: Yes: Other


Edema: Yes


Peripheral Pulses WNL: Yes


Integumentary: Yes: WNL


Wound/Incision: Yes: Clean/Dry


Neurological: Yes: Pre-Existing Deficit, Other


...Motor Strength: LLE, RLE


Labs: 


 CBC, BMP





 01/09/18 05:20 





 01/09/18 05:20 





 INR, PTT











INR  1.30  (0.82-1.09)  H D 01/05/18  20:09    














Problem List





- Problems


(1) Anoxia


Code(s): R09.02 - HYPOXEMIA   





(2) Cardiac arrest


Code(s): I46.9 - CARDIAC ARREST, CAUSE UNSPECIFIED   





(3) DVT prophylaxis


Code(s): TPL6197 -    





(4) Acute and chronic respiratory failure with hypercapnia


Code(s): J96.22 - ACUTE AND CHRONIC RESPIRATORY FAILURE WITH HYPERCAPNIA   





(5) Acute hyperkalemia


Code(s): E87.5 - HYPERKALEMIA   





(6) Acute on chronic systolic (congestive) heart failure


Code(s): I50.23 - ACUTE ON CHRONIC SYSTOLIC (CONGESTIVE) HEART FAILURE   





(7) Acute renal failure (ARF)


Code(s): N17.9 - ACUTE KIDNEY FAILURE, UNSPECIFIED   


Qualifiers: 


   Acute renal failure type: unspecified   Qualified Code(s): N17.9 - Acute 

kidney failure, unspecified   





(8) Anemia


Code(s): D64.9 - ANEMIA, UNSPECIFIED   


Qualifiers: 


   Anemia type: other cause   Other causes of anemia: chronic disease, other   

Qualified Code(s): D63.8 - Anemia in other chronic diseases classified 

elsewhere   





Assessment/Plan





VENT SUPPORT


BP SUPPORT


NOREPINEPHRINE IV FOR BP SUPPORT


PULM EVAL APPRECIATED


IVF


CARDIO EVAL


CAN TRANSFER TO VENT UNIT WHEN OFF PRESSORS

## 2018-01-09 NOTE — PN
Teaching Attending Note


Name of Resident: Latoya León





ATTENDING PHYSICIAN STATEMENT





I saw and evaluated the patient.


I reviewed the resident's note and discussed the case with the resident.


I agree with the resident's findings and plan as documented.








SUBJECTIVE:





Pt seen and examined in the ICU. Remains intubated and sedated on propofol gtt. 

No fevers recorded. Off pressors.





OBJECTIVE:





 Last Vital Signs











Temp Pulse Resp BP Pulse Ox


 


 98.4 F   60   16   128/61   98 


 


 01/09/18 04:00  01/09/18 10:00  01/09/18 10:00  01/09/18 10:00  01/08/18 20:25








 Intake & Output











 01/06/18 01/07/18 01/08/18 01/09/18





 23:59 23:59 23:59 23:59


 


Intake Total  3366 2480 1227.6


 


Output Total 750 1400 900 250


 


Balance -750 1966 1580 977.6


 


Weight 81.647 kg   








Gen:  intubated, sedated


Heart: RRR


Lung: scattered rhonchi bilaterally


Abd: soft, nontender


Ext: no edema





 CBC, BMP





 01/09/18 05:20 





 01/09/18 05:20 





CXR: no infiltrates





Active Medications





Albuterol/Ipratropium (Duoneb -)  1 amp NEB Q30M PRN


   PRN Reason: SHORTNESS OF BREATH


   Last Admin: 01/09/18 05:35 Dose:  1 amp


Aspirin (Asa -)  81 mg NGT DAILY [facility]


   Last Admin: 01/09/18 10:39 Dose:  81 mg


Atorvastatin Calcium (Lipitor -)  10 mg NGT HS [facility]


   Last Admin: 01/08/18 21:05 Dose:  10 mg


Chlorhexidine Gluconate (Hibiclens For Decolonization -)  1 applic TP HS [facility]


   Last Admin: 01/08/18 21:17 Dose:  1 applic


Sodium Chloride (Normal Saline -)  1,000 mls @ 100 mls/hr IV ASDIR [facility]


   Last Admin: 01/09/18 06:47 Dose:  100 mls/hr


Propofol (Diprivan -)  1,000,000 mcg in 100 mls @ 2.449 mls/hr IVPB TITR DOROTHY; 5 

MCG/KG/MIN


   PRN Reason: Protocol


   Last Admin: 01/08/18 16:20 Dose:  5 mcg/kg/min, 2.449 mls/hr


Norepinephrine Bitartrate 8, (000 mcg/ Dextrose)  500 mls @ 9.18 mls/hr IV 

ASDIR DOROTHY; 0.03 MCG/KG/MIN


   PRN Reason: Protocol


   Last Admin: 01/09/18 06:46 Dose:  Not Given


Methylprednisolone Sodium Succinate (Solu-Medrol -)  60 mg IVPB BID [facility]


   Last Admin: 01/09/18 10:38 Dose:  60 mg


Mupirocin (Bactroban Ointment (For Decolonization) -)  1 applic NS BID [facility]


   Stop: 01/10/18 21:59


   Last Admin: 01/09/18 10:39 Dose:  1 applic








ASSESSMENT AND PLAN:


PEA Cardiac Arrest


Acute on Chronic Hypercapneic and Hypoxic Respiratory Failure


Severe COPD - r/o Acute Exacerbation


Paroxysmal Atrial Flutter


CAD s/p CABG 


s/p bioAVR


Lactic Acidosis


Elevated LFTs likely ischemic injury


+Troponins likely Demand Ischemia


HTN


Hyperlipidemia


DM





-  continue IV medrol


-  inhaled bronchodilators standing and PRN


-  taper FiO2 to keep SpO2 >90%


-  hold sedation to assess mental status


-  echocardiogram


-  continue volume assist control


-  ASA, statin


-  monitor lytes


-  enteral feeds if mental status not improved off sedation


-  DVT/GI prophylaxis


-  can monitor on vent unit if not requiring sedation











critical care time spent in reviewing chart, evaluating patient and formulating 

plan 35 min

## 2018-01-09 NOTE — PN
Physical Exam: 


SUBJECTIVE: Patient seen and examined. Still sedated and intubated. Propofol 

restarted yesterday due to overventilation.








OBJECTIVE:





 Vital Signs











 Period  Temp  Pulse  Resp  BP Sys/Regalado  Pulse Ox


 


 Last 24 Hr  97.0 F-98.8 F  50-85  15-24  122-165/65-88  98-99











GENERAL: sedated and intubated, eye opening to pain


EYES: Bilateral equal pinpoint pupils, slowly reactive. 


ENT: Tracheostomy tube in place-VCV-AC RR-16, 30% fiO2, TV-450, 7/8.


NECK: Tracheostomy tube centally located and secure in place. 


LUNGS: Mechanical breath sounds bilaterally 


HEART: S1, S2 


ABDOMEN: Soft, normoactive bowel sounds present


EXTREMITIES: 2+ pulses, warm, well-perfused, no edema. 


NEUROLOGICAL: sedated and intubated, babinski- LLE- downward, R- equivocal


PSYCH: Cannot assess


SKIN: Warm, dry


Lines: RIJ, Tracheostomy tube in place, NGT, LUE peripheral line, Lion in 

place draining clear urine














 Laboratory Results - last 24 hr











  01/08/18 01/08/18 01/09/18





  09:20 23:52 05:20


 


WBC   


 


RBC   


 


Hgb   


 


Hct   


 


MCV   


 


MCH   


 


MCHC   


 


RDW   


 


Plt Count   


 


MPV   


 


Neutrophils %   


 


Lymphocytes %   


 


Monocytes %   


 


Eosinophils %   


 


Basophils %   


 


PTT (Actin FS)   


 


Puncture Site  No Result Required.  


 


ABG pH  7.44  


 


ABG pCO2 at Pt Temp  38.5  


 


ABG pO2 at Pt Temp  85.4  D  


 


ABG HCO3  25.4  


 


ABG O2 Sat (Measured)  96.8  


 


ABG O2 Content  13.1 L  


 


ABG Base Excess  1.7  


 


Jamel Test  Not applicable  


 


O2 Delivery Device   


 


Oxygen Flow Rate  30  


 


Vent Mode  A/c  


 


Vent Rate  20  


 


Mechanical Rate  Esput  


 


PEEP  8.0  


 


Pressure Support Vent  450  


 


Sodium    144


 


Potassium    4.4


 


Chloride    107


 


Carbon Dioxide    29


 


Anion Gap    8


 


BUN    45 H


 


Creatinine    1.0  D


 


Creat Clearance w eGFR    > 60


 


POC Glucometer   [phone] 


 


Random Glucose    173 H


 


Calcium    8.5


 


Phosphorus    3.3  D


 


Magnesium    2.3  D


 


Total Bilirubin    0.8


 


AST    28


 


ALT    83 H


 


Alkaline Phosphatase    68


 


Total Protein    6.0 L


 


Albumin    3.0 L














  01/09/18 01/09/18 01/09/18





  05:20 05:20 06:30


 


WBC   7.8 


 


RBC   4.14 


 


Hgb   10.2 L 


 


Hct   32.6 L 


 


MCV   78.8 L 


 


MCH   24.7 L 


 


MCHC   31.3 L 


 


RDW   17.9 H 


 


Plt Count   220  D 


 


MPV   8.4 


 


Neutrophils %   91.1 H 


 


Lymphocytes %   4.3 L D 


 


Monocytes %   4.6 


 


Eosinophils %   0.0  D 


 


Basophils %   0.0 


 


PTT (Actin FS)  25.7 L  


 


Puncture Site   


 


ABG pH   


 


ABG pCO2 at Pt Temp   


 


ABG pO2 at Pt Temp   


 


ABG HCO3   


 


ABG O2 Sat (Measured)   


 


ABG O2 Content   


 


ABG Base Excess   


 


Jamel Test   


 


O2 Delivery Device   


 


Oxygen Flow Rate   


 


Vent Mode   


 


Vent Rate   


 


Mechanical Rate   


 


PEEP   


 


Pressure Support Vent   


 


Sodium   


 


Potassium   


 


Chloride   


 


Carbon Dioxide   


 


Anion Gap   


 


BUN   


 


Creatinine   


 


Creat Clearance w eGFR   


 


POC Glucometer    [phone]


 


Random Glucose   


 


Calcium   


 


Phosphorus   


 


Magnesium   


 


Total Bilirubin   


 


AST   


 


ALT   


 


Alkaline Phosphatase   


 


Total Protein   


 


Albumin   














  01/09/18





  07:30


 


WBC 


 


RBC 


 


Hgb 


 


Hct 


 


MCV 


 


MCH 


 


MCHC 


 


RDW 


 


Plt Count 


 


MPV 


 


Neutrophils % 


 


Lymphocytes % 


 


Monocytes % 


 


Eosinophils % 


 


Basophils % 


 


PTT (Actin FS) 


 


Puncture Site  Right radial


 


ABG pH  7.38


 


ABG pCO2 at Pt Temp  46.2 H


 


ABG pO2 at Pt Temp  86.3


 


ABG HCO3  26.6 H


 


ABG O2 Sat (Measured)  96.7


 


ABG O2 Content  13.2 L


 


ABG Base Excess  1.7


 


Jamel Test  Positive


 


O2 Delivery Device  Vent


 


Oxygen Flow Rate  30


 


Vent Mode 


 


Vent Rate  16


 


Mechanical Rate  Yes


 


PEEP  8.0


 


Pressure Support Vent  450


 


Sodium 


 


Potassium 


 


Chloride 


 


Carbon Dioxide 


 


Anion Gap 


 


BUN 


 


Creatinine 


 


Creat Clearance w eGFR 


 


POC Glucometer 


 


Random Glucose 


 


Calcium 


 


Phosphorus 


 


Magnesium 


 


Total Bilirubin 


 


AST 


 


ALT 


 


Alkaline Phosphatase 


 


Total Protein 


 


Albumin 








Active Medications











Generic Name Dose Route Start Last Admin





  Trade Name Freq  PRN Reason Stop Dose Admin


 


Albuterol/Ipratropium  1 amp  01/06/18 17:06  01/09/18 05:35





  Duoneb -  NEB   1 amp





  Q30M PRN   Administration





  SHORTNESS OF BREATH   


 


Aspirin  81 mg  01/06/18 10:00  01/08/18 10:00





  Asa -  NGT   81 mg





  DAILY DOROTHY   Administration


 


Atorvastatin Calcium  10 mg  01/06/18 22:00  01/08/18 21:05





  Lipitor -  NGT   10 mg





  HS DOROTHY   Administration


 


Chlorhexidine Gluconate  1 applic  01/05/18 22:00  01/08/18 21:17





  Hibiclens For Decolonization -  TP   1 applic





  HS DOROTHY   Administration


 


Sodium Chloride  1,000 mls @ 100 mls/hr  01/06/18 06:00  01/09/18 06:47





  Normal Saline -  IV   100 mls/hr





  ASDIR DOROTHY   Administration


 


Propofol  1,000,000 mcg in 100 mls @ 2.449 mls/hr  01/06/18 17:45  01/08/18 16:

20





  Diprivan -  IVPB   5 mcg/kg/min





  TITR DOROTHY   2.449 mls/hr





  Protocol   Administration





  5 MCG/KG/MIN   


 


Norepinephrine Bitartrate 8,  500 mls @ 9.18 mls/hr  01/07/18 06:45  01/09/18 06

:46





  000 mcg/ Dextrose  IV   Not Given





  ASDIR DOROTHY   





  Protocol   





  0.03 MCG/KG/MIN   


 


Methylprednisolone Sodium Succinate  60 mg  01/09/18 10:00  





  Solu-Medrol -  IVPB   





  BID DOROTHY   


 


Mupirocin  1 applic  01/05/18 22:00  01/08/18 21:17





  Bactroban Ointment (For Decolonization) -  NS  01/10/18 21:59  1 applic





  BID DOROTHY   Administration











ASSESSMENT/PLAN:


73 y/o M w/ PMH on tracheostomy, COPD, DM2, MI s/p CABG with Ao valve 

replacement, s/p PPM, HTN, HLD previous hx of cardiac arrest presented with 

hypoxia, s/p dislodged trach 





Neuro:


   AMS 2/2 anoxic brain injury s/p cardiac arrest


   Seen by neurology- Dr Nixon


   Sedation vacation to monitor mental status


   Monitor


   Son by bedside- thinks patient is moving more


Cardio:


   PEA cardiac arrest


   CAD hx-s/p CABG with Ao valve replacement, s/p PPM


   HTN


   HLD


   Hypotension 


   Atorvastatin 10mg HS NGT


   Levophed at 9.18ml/hr- wean


   ASA 81mg NGT daily


   Echo


   Remove arterial line





Respiratory:


   Acute on Chronic hypoxic, hypercapnic respiratory failure


   COPD R/O exacerbation


   RR reduced 16 to 14, TV- 400


   ABGs


   Sedation vacation 


   Albuterol neb-daily


   Duonebs-1amp neb Q6H


   Iv methylprednisolone  iv 60mg 12hrly 


   Elevate HOB


   Aspiration precautions


   Significant secretions


   CXR- improving





Metab/Fluids/Lines/GI:


   DM-


   BGMs


   ISS


   Monitor


   IV Normal saline @100ml/hr


   tracheostomy,RIJ, Lion, OGT, 


   R arterial line- for removal


   CMP, Mg, Phosphorus


   Hold feeds for now


ID:


   Follow cultures- blood cultures- no growth





Prophylaxis:


   iv Protonix 40mg bid


   Bilateral SCDs


   Heparin SQ 5000iu tid





Dispo:


   For likely transfer to [facility] if Mental status improves


   Palliative care- Ashia having a challenging time discussing end of life with 

patient's relative since he recovered after a previous arrest


   








Visit type





- Emergency Visit


Emergency Visit: Yes


ED Registration Date: 01/05/18


Care time: The patient presented to the Emergency Department on the above date 

and was hospitalized for further evaluation of their emergent condition.





- New Patient


This patient is new to me today: No





- Critical Care


Critical Care patient: Yes


Total Critical Care Time (in minutes): 35


Critical Care Statement: The care of this patient involved high complexity 

decision making to prevent further life threatening deterioration of the patient

's condition and/or to evaluate & treat vital organ system(s) failure or risk 

of failure.





- Discharge Referral


Referred to [facility] No

## 2018-01-09 NOTE — PN
Progress Note (short form)





- Note


Progress Note: 








Chief Complaint: 





s/p PEA arrest


History of Present Illness: 





intubated 


not responsive,  no events overnight.  off pressors





 


 Current Medications





Albuterol/Ipratropium (Duoneb -)  1 amp NEB Q30M PRN


   PRN Reason: SHORTNESS OF BREATH


   Last Admin: 01/09/18 05:35 Dose:  1 amp


Aspirin (Asa -)  81 mg NGT DAILY [facility]


   Last Admin: 01/09/18 10:39 Dose:  81 mg


Atorvastatin Calcium (Lipitor -)  10 mg NGT HS [facility]


   Last Admin: 01/08/18 21:05 Dose:  10 mg


Chlorhexidine Gluconate (Hibiclens For Decolonization -)  1 applic TP HS [facility]


   Last Admin: 01/08/18 21:17 Dose:  1 applic


Sodium Chloride (Normal Saline -)  1,000 mls @ 100 mls/hr IV ASDIR [facility]


   Last Admin: 01/09/18 06:47 Dose:  100 mls/hr


Propofol (Diprivan -)  1,000,000 mcg in 100 mls @ 2.449 mls/hr IVPB TITR DOROTHY; 5 

MCG/KG/MIN


   PRN Reason: Protocol


   Last Admin: 01/08/18 16:20 Dose:  5 mcg/kg/min, 2.449 mls/hr


 


Methylprednisolone Sodium Succinate (Solu-Medrol -)  60 mg IVPB BID [facility]


   Last Admin: 01/09/18 10:38 Dose:  60 mg


Mupirocin (Bactroban Ointment (For Decolonization) -)  1 applic NS BID [facility]


   Stop: 01/10/18 21:59


   Last Admin: 01/09/18 10:39 Dose:  1 applic














- Objective


Vital Signs: 


 


 Vital Signs - 24 hr











  01/08/18 01/08/18 01/08/18





  11:58 12:00 14:00


 


Temperature   97.2 F L


 


Pulse Rate   69


 


Respiratory 16 20 17





Rate   


 


Blood Pressure  155/82 156/86


 


O2 Sat by Pulse   





Oximetry (%)   














  01/08/18 01/08/18 01/08/18





  14:49 16:00 17:29


 


Temperature  97.0 F L 


 


Pulse Rate   


 


Respiratory 18 24 16





Rate   


 


Blood Pressure  146/85 


 


O2 Sat by Pulse   





Oximetry (%)   














  01/08/18 01/08/18 01/08/18





  18:00 19:18 20:00


 


Temperature 97.2 F L  


 


Pulse Rate   


 


Respiratory 20 16 15





Rate   


 


Blood Pressure 145/74  146/80


 


O2 Sat by Pulse   98





Oximetry (%)   














  01/08/18 01/08/18 01/08/18





  20:25 21:30 22:00


 


Temperature   97.5 F L


 


Pulse Rate   68


 


Respiratory  16 16





Rate   


 


Blood Pressure   141/81


 


O2 Sat by Pulse 98  





Oximetry (%)   














  01/09/18 01/09/18 01/09/18





  00:00 00:32 02:00


 


Temperature   


 


Pulse Rate 70  67


 


Respiratory 16 16 16





Rate   


 


Blood Pressure 130/74  136/71


 


O2 Sat by Pulse   





Oximetry (%)   














  01/09/18 01/09/18 01/09/18





  03:48 04:00 06:00


 


Temperature  98.4 F 


 


Pulse Rate  67 66


 


Respiratory 16 16 18





Rate   


 


Blood Pressure  128/76 133/65


 


O2 Sat by Pulse   





Oximetry (%)   














  01/09/18 01/09/18





  06:47 08:00


 


Temperature  


 


Pulse Rate  50 L


 


Respiratory 20 20





Rate  


 


Blood Pressure  122/66


 


O2 Sat by Pulse  





Oximetry (%)  











 Intake & Output











 01/07/18 01/08/18 01/09/18 01/10/18





 07:59 07:59 07:59 07:59


 


Intake Total 1175 3221 2677.6 


 


Output Total 950 1500 850 


 


Balance 225 1721 1827.6 


 


Weight 180 lb   














Constitutional: Yes: Well Nourished, No Distress, Calm


Cardiovascular: Yes: Regular Rate and Rhythm (decr intensity), S1, S2.  No: JVD 

(tds exam), Gallop, Murmur


Respiratory: Yes: Regular, CTA Bilaterally (anteriorly).  No: Accessory Muscle 

Use


Extremities: No: Cold


Edema: 1+ diffuse dependent


Neurological: No: Alert, Oriented


Psychiatric: No: Agitated


Labs: 


 





 CBC, BMP





 01/09/18 05:20 





 01/09/18 05:20 














- ....Imaging


EKG: Other (tele: NSR, rare pvc's.)





Assessment/Plan





ecg 1/5/18:  sr, pvcs, nl intervals, twi ant/lat, no st changes





cxr 1/9: minimaal increased lung markings bilaterally.  minimal blunting of the 

costophrenic angle.  





CXR: small right pleural effusion with underlying atelectasis vs. early 

infiltrate.





mibi 9/2015:  no ischemia, large nilson apical and septal scars, lvef 38%





mibi 12/2012:  large anteroapical, apical , inferior, septal scars, cannot 

exclude mild septal ischemia, lvef mildly reduced








echo here 1/2018: nl lv size.  severely reduced lvef.  global.  nl rv size.  no 

mention of fn.  well seated bioavr, nl fn.  no ar.  1+ mac.  mod tr rvsp 30-40.

  


Echo 1/16: very TDS; severely decr LVEF, can't assess RWMAs; RV tds; valve fxn 

WNL





Cath 2/2016 MSH: 3vd with patent LIMA-LAD and SVG-RCA











a/p:  72 m hx cad s/p MI and cabg and bioAVR, htn, hld, ischemic cardiomyopathy

, prior VF cardiac arrest 2/2016 now s/p ICD ([place]), paroxysmal atrial 

flutter (brief episodes in setting of sepsis, no AC initiated), copd on home 02

, chronic resp failure s/p trach, dm, here with hypoxia/PEA arrest.





anoxic brain injury 2/2 PEA cardiac arrest in the setting of acute Hypercapnic 

Respiratory Failure:


-? trach dislodgement triggered sequence of events


-doubt acs here (troponins low/non-diagnostic range, nonspecific ECG changes 

obtained s/p hypoxia and cardiac arrest)


-no signs CHF 


-vent mgmt per critical care


-echo here similar to priors.  


-Patient has ICD ([place]) with routine remote checks (via EP at [facility])--if 

makes meaningful recovery, will f/u with EP regarding any detected arrhythmias 

at time of event


-monitor tele, as doing





HTN:


-has been labiler here.  both high ? secondary to increased ICP, and 

transiently low requiring pressors - now off.  bp now stable off anti-htn. 


 





COPD/chronic resp failure:


-s/p trach, with mechanical ventilation requirement





cad with h/o mi, cabg:


- ACS not likely here, as above


- no obstructive dz at cath 2/16


- on statin, asa 





ischemic cardiomyopathy with h/o VF s/p ICD


- held BB, ACE here while acutely hypotensive


- low threshold to resume if bp remains elevated/stable


- cvp elevated, but recently on pressors and  appears clinically euvolemic-- 

defer diuresis for now. 


- has primary prevn ICD, monitor tele


- electrolyte repletion prn. 





parox atrial flutter on prior admission with sepsis


- episodes were brief and patient was not placed on long term anticoagulation.


- remains in sr here.   


 


s/p bioAVR


-done in 2013 at time of CABG


-TDS echo in past (not helpful for valve fxn)--> echo here --> well seated, no 

as or ar.  





est time spent in data review, pt exam, and formulating mgmt plan of 

potentially life-threatening illness = 35 min

## 2018-01-10 LAB
ALBUMIN SERPL-MCNC: 2.7 G/DL (ref 3.4–5)
ALP SERPL-CCNC: 61 U/L (ref 45–117)
ALT SERPL-CCNC: 66 U/L (ref 12–78)
ANION GAP SERPL CALC-SCNC: 6 MMOL/L (ref 8–16)
AST SERPL-CCNC: 16 U/L (ref 15–37)
BASOPHILS # BLD: 0 % (ref 0–2)
BILIRUB SERPL-MCNC: 0.7 MG/DL (ref 0.2–1)
BUN SERPL-MCNC: 37 MG/DL (ref 7–18)
CALCIUM SERPL-MCNC: 8.3 MG/DL (ref 8.5–10.1)
CHLORIDE SERPL-SCNC: 112 MMOL/L (ref 98–107)
CO2 SERPL-SCNC: 27 MMOL/L (ref 21–32)
CREAT SERPL-MCNC: 0.8 MG/DL (ref 0.7–1.3)
DEPRECATED RDW RBC AUTO: 17.9 % (ref 11.9–15.9)
EOSINOPHIL # BLD: 0 % (ref 0–4.5)
GLUCOSE SERPL-MCNC: 163 MG/DL (ref 74–106)
HCT VFR BLD CALC: 31.1 % (ref 35.4–49)
HGB BLD-MCNC: 9.7 GM/DL (ref 11.7–16.9)
LYMPHOCYTES # BLD: 5.8 % (ref 8–40)
MAGNESIUM SERPL-MCNC: 2.4 MG/DL (ref 1.8–2.4)
MCH RBC QN AUTO: 24.9 PG (ref 25.7–33.7)
MCHC RBC AUTO-ENTMCNC: 31.2 G/DL (ref 32–35.9)
MCV RBC: 79.9 FL (ref 80–96)
MONOCYTES # BLD AUTO: 5.7 % (ref 3.8–10.2)
NEUTROPHILS # BLD: 88.5 % (ref 42.8–82.8)
PHOSPHATE SERPL-MCNC: 3.1 MG/DL (ref 2.5–4.9)
PLATELET # BLD AUTO: 194 K/MM3 (ref 134–434)
PMV BLD: 8.1 FL (ref 7.5–11.1)
POTASSIUM SERPLBLD-SCNC: 4.5 MMOL/L (ref 3.5–5.1)
PROT SERPL-MCNC: 5.4 G/DL (ref 6.4–8.2)
RBC # BLD AUTO: 3.89 M/MM3 (ref 4–5.6)
SODIUM SERPL-SCNC: 145 MMOL/L (ref 136–145)
WBC # BLD AUTO: 6.9 K/MM3 (ref 4–10)

## 2018-01-10 RX ADMIN — IPRATROPIUM BROMIDE AND ALBUTEROL SULFATE SCH AMP: .5; 3 SOLUTION RESPIRATORY (INHALATION) at 20:45

## 2018-01-10 RX ADMIN — SODIUM CHLORIDE SCH: 9 INJECTION, SOLUTION INTRAVENOUS at 06:26

## 2018-01-10 RX ADMIN — IPRATROPIUM BROMIDE AND ALBUTEROL SULFATE SCH AMP: .5; 3 SOLUTION RESPIRATORY (INHALATION) at 08:40

## 2018-01-10 RX ADMIN — ATORVASTATIN CALCIUM SCH MG: 10 TABLET, FILM COATED ORAL at 21:42

## 2018-01-10 RX ADMIN — HEPARIN SODIUM SCH: 5000 INJECTION, SOLUTION INTRAVENOUS; SUBCUTANEOUS at 02:51

## 2018-01-10 RX ADMIN — PROPOFOL SCH MLS/HR: 10 INJECTION, EMULSION INTRAVENOUS at 02:51

## 2018-01-10 RX ADMIN — SODIUM CHLORIDE SCH MLS/HR: 9 INJECTION, SOLUTION INTRAVENOUS at 02:51

## 2018-01-10 RX ADMIN — METHYLPREDNISOLONE SODIUM SUCCINATE SCH MG: 125 INJECTION, POWDER, FOR SOLUTION INTRAMUSCULAR; INTRAVENOUS at 21:42

## 2018-01-10 RX ADMIN — IPRATROPIUM BROMIDE AND ALBUTEROL SULFATE SCH AMP: .5; 3 SOLUTION RESPIRATORY (INHALATION) at 12:16

## 2018-01-10 RX ADMIN — METHYLPREDNISOLONE SODIUM SUCCINATE SCH MG: 125 INJECTION, POWDER, FOR SOLUTION INTRAMUSCULAR; INTRAVENOUS at 17:50

## 2018-01-10 RX ADMIN — HEPARIN SODIUM SCH UNIT: 5000 INJECTION, SOLUTION INTRAVENOUS; SUBCUTANEOUS at 06:41

## 2018-01-10 RX ADMIN — HEPARIN SODIUM SCH UNIT: 5000 INJECTION, SOLUTION INTRAVENOUS; SUBCUTANEOUS at 21:42

## 2018-01-10 RX ADMIN — ASPIRIN 81 MG SCH MG: 81 TABLET ORAL at 12:14

## 2018-01-10 RX ADMIN — HEPARIN SODIUM SCH UNIT: 5000 INJECTION, SOLUTION INTRAVENOUS; SUBCUTANEOUS at 17:50

## 2018-01-10 RX ADMIN — IPRATROPIUM BROMIDE AND ALBUTEROL SULFATE SCH AMP: .5; 3 SOLUTION RESPIRATORY (INHALATION) at 18:22

## 2018-01-10 NOTE — PN
Progress Note, Physician


Chief Complaint: 





INTUBATED VENT SUPPORT


UNRESPONSIVE TO STIMULI





- Current Medication List


Current Medications: 


Active Medications





Albuterol/Ipratropium (Duoneb -)  1 amp NEB Q30M PRN


   PRN Reason: SHORTNESS OF BREATH


   Last Admin: 01/09/18 05:35 Dose:  1 amp


Albuterol/Ipratropium (Duoneb -)  1 amp NEB RQID [facility]


   Last Admin: 01/10/18 08:40 Dose:  1 amp


Aspirin (Asa -)  81 mg NGT DAILY [facility]


   Last Admin: 01/09/18 10:39 Dose:  81 mg


Atorvastatin Calcium (Lipitor -)  10 mg NGT HS [facility]


   Last Admin: 01/09/18 21:51 Dose:  10 mg


Heparin Sodium (Porcine) (Heparin -)  5,000 unit SQ TID [facility]


   Last Admin: 01/10/18 06:41 Dose:  5,000 unit


Sodium Chloride (Normal Saline -)  1,000 mls @ 100 mls/hr IV ASDIR [facility]


   Last Admin: 01/10/18 06:26 Dose:  Not Given


Propofol (Diprivan -)  1,000,000 mcg in 100 mls @ 2.449 mls/hr IVPB TITR DOROTHY; 5 

MCG/KG/MIN


   PRN Reason: Protocol


   Last Titration: 01/10/18 10:58 Dose:  0 mcg/kg/min, 0 mls/hr


Norepinephrine Bitartrate 8, (000 mcg/ Dextrose)  500 mls @ 9.18 mls/hr IV 

ASDIR DOROTHY; 0.03 MCG/KG/MIN


   PRN Reason: Protocol


   Last Admin: 01/09/18 06:46 Dose:  Not Given


Methylprednisolone Sodium Succinate (Solu-Medrol -)  30 mg IVPB BID [facility]











- Objective


Vital Signs: 


 Vital Signs











Temperature  98 F   01/10/18 06:00


 


Pulse Rate  69   01/10/18 09:54


 


Respiratory Rate  14   01/10/18 09:54


 


Blood Pressure  122/57   01/10/18 06:00


 


O2 Sat by Pulse Oximetry (%)  98   01/10/18 09:54











Constitutional: Yes: Moderate Distress


Eyes: Yes: Other


HENT: Yes: WNL


Neck: Yes: WNL


Cardiovascular: Yes: WNL


Respiratory: Yes: Mechanically Ventilated, Poor Air Entry


Gastrointestinal: Yes: WNL


Musculoskeletal: Yes: Muscle Weakness


Extremities: Yes: WNL


Edema: Yes


Peripheral Pulses WNL: Yes


Integumentary: Yes: WNL


Wound/Incision: Yes: Clean/Dry


Neurological: Yes: Unresponsive, Weakness


...Motor Strength: LLE, RLE


Psychiatric: Yes: Other


Labs: 


 CBC, BMP





 01/10/18 06:25 





 01/10/18 06:25 





 INR, PTT











INR  1.30  (0.82-1.09)  H D 01/05/18  20:09    














Problem List





- Problems


(1) Anoxia


Code(s): R09.02 - HYPOXEMIA   





(2) Cardiac arrest


Code(s): I46.9 - CARDIAC ARREST, CAUSE UNSPECIFIED   





(3) DVT prophylaxis


Code(s): JJF6444 -    





(4) Acute and chronic respiratory failure with hypercapnia


Code(s): J96.22 - ACUTE AND CHRONIC RESPIRATORY FAILURE WITH HYPERCAPNIA   





(5) Acute hyperkalemia


Code(s): E87.5 - HYPERKALEMIA   





(6) Acute on chronic systolic (congestive) heart failure


Code(s): I50.23 - ACUTE ON CHRONIC SYSTOLIC (CONGESTIVE) HEART FAILURE   





(7) Acute renal failure (ARF)


Code(s): N17.9 - ACUTE KIDNEY FAILURE, UNSPECIFIED   


Qualifiers: 


   Acute renal failure type: unspecified   Qualified Code(s): N17.9 - Acute 

kidney failure, unspecified   





(8) Anemia


Code(s): D64.9 - ANEMIA, UNSPECIFIED   


Qualifiers: 


   Anemia type: other cause   Other causes of anemia: chronic disease, other   

Qualified Code(s): D63.8 - Anemia in other chronic diseases classified 

elsewhere   





Assessment/Plan





VENT SUPPORT


BP SUPPORT


NOREPINEPHRINE IV FOR BP SUPPORT


PULM EVAL APPRECIATED


IVF


CARDIO EVAL


CAN TRANSFER TO VENT UNIT WHEN OFF PRESSORS

## 2018-01-10 NOTE — PN
Teaching Attending Note


Name of Resident: Latoya León





ATTENDING PHYSICIAN STATEMENT





I saw and evaluated the patient.


I reviewed the resident's note and discussed the case with the resident.


I agree with the resident's findings and plan as documented.








SUBJECTIVE:





Pt seen and examined in the ICU. Placed on propofol gtt overnight for 

tachypnea. Poor mental status off sedation. Remains vented on volume assist 

control.





OBJECTIVE:





 Last Vital Signs











Temp Pulse Resp BP Pulse Ox


 


 98.3 F   64   12   120/63   98 


 


 01/10/18 10:00  01/10/18 12:00  01/10/18 12:15  01/10/18 12:00  01/10/18 09:54








 Intake & Output











 01/07/18 01/08/18 01/09/18 01/10/18





 23:59 23:59 23:59 23:59


 


Intake Total 3366 2480 1227.6 160


 


Output Total 1400 900 850 300


 


Balance 1966 1580 377.6 -140








Gen:  vented, unresponsive, tachypneic


Heart: RRR


Lung: distant breath sounds, poor air movement


Abd: soft, nontender


Ext: no edema





 CBC, BMP





 01/10/18 06:25 





 01/10/18 06:25 





CXR:  no infiltrates





Active Medications





Albuterol/Ipratropium (Duoneb -)  1 amp NEB RQID [facility]


   Last Admin: 01/10/18 12:16 Dose:  1 amp


Aspirin (Asa -)  81 mg NGT DAILY [facility]


   Last Admin: 01/10/18 12:14 Dose:  81 mg


Atorvastatin Calcium (Lipitor -)  10 mg NGT HS [facility]


   Last Admin: 01/09/18 21:51 Dose:  10 mg


Heparin Sodium (Porcine) (Heparin -)  5,000 unit SQ TID [facility]


   Last Admin: 01/10/18 06:41 Dose:  5,000 unit


Methylprednisolone Sodium Succinate (Solu-Medrol -)  60 mg IVPB BID [facility]


Morphine Sulfate (Morphine Injection -)  2 mg IVPUSH Q3H PRN


   PRN Reason: ANXIETY








ASSESSMENT AND PLAN:


PEA Cardiac Arrest


Acute on Chronic Hypercapneic and Hypoxic Respiratory Failure


Severe COPD - r/o Acute Exacerbation


Paroxysmal Atrial Flutter


CAD s/p CABG 


s/p bioAVR


Lactic Acidosis resolved


Elevated LFTs likely ischemic injury


Severe LV Systolic Dysfunction


+Troponins likely Demand Ischemia


HTN


Hyperlipidemia


DM





-  continue IV medrol at same dose


-  inhaled bronchodilators standing and PRN


-  taper FiO2 to keep SpO2 >90%


-  minimize sedation to assess mental status, can use morphine PRN for tachypnea


-  continue volume assist control, not a candidate for weaning at this time


-  ASA, statin


-  monitor lytes


-  enteral feeds


-  DVT/GI prophylaxis


-  can monitor on vent unit 











critical care time spent in reviewing chart, evaluating patient and formulating 

plan 35 min

## 2018-01-10 NOTE — PN
Progress Note (short form)





- Note


Progress Note: 


s:  sedated on vent.no overnight events





o:


 


 Vital Signs











Temp  98 F   01/10/18 06:00


 


Pulse  69   01/10/18 09:54


 


Resp  14   01/10/18 09:54


 


BP  122/57   01/10/18 06:00


 


Pulse Ox  98   01/10/18 09:54








 Intake & Output











 01/09/18 01/09/18 01/10/18





 11:59 23:59 11:59


 


Intake Total 1227.6  160


 


Output Total 250 600 300


 


Balance 977.6 -600 -140


 


Intake:   


 


  IV 1227.6  160


 


    DIPRIVAN - 1,000,000 mcg 27.6  40





    In 100 ml @ 5 MCG/KG/MIN   





    2.449 mls/hr IVPB TITR   





    DOROTHY Rx#:[id number]   


 


    Normal Saline - 1,000 ml 1200  120





    @ 100 mls/hr IV ASDIR DOROTHY   





    Rx#:[id number]   


 


Output:   


 


  Urine 250 600 300


 


    Lion 250 600 300


 


Other:   


 


  Voiding Method Indwelling Catheter Indwelling Catheter 








NAD, calm, on mechanical ventilation


JVD flat, neck supple


diminished bs


RRR nl s1, s2. no m/r/g


+ bs soft, no distension


no le e/c/c


pos dp/pt


no jaundice, diaphoresis


sedated





 


 


 


 Current Medications











Generic Name Dose Route Start Last Admin





  Trade Name Frejean-paul  PRN Reason Stop Dose Admin


 


Albuterol/Ipratropium  1 amp  01/06/18 17:06  01/09/18 05:35





  Duoneb -  NEB   1 amp





  Q30M PRN   Administration





  SHORTNESS OF BREATH   


 


Albuterol/Ipratropium  1 amp  01/09/18 20:00  01/10/18 08:40





  Duoneb -  NEB   1 amp





  RQID DOROTHY   Administration


 


Aspirin  81 mg  01/06/18 10:00  01/09/18 10:39





  Asa -  NGT   81 mg





  DAILY DOROTHY   Administration


 


Atorvastatin Calcium  10 mg  01/06/18 22:00  01/09/18 21:51





  Lipitor -  NGT   10 mg





  HS DOROTHY   Administration


 


Heparin Sodium (Porcine)  5,000 unit  01/09/18 15:00  01/10/18 06:41





  Heparin -  SQ   5,000 unit





  TID DOROTHY   Administration


 


Sodium Chloride  1,000 mls @ 100 mls/hr  01/06/18 06:00  01/10/18 06:26





  Normal Saline -  IV   Not Given





  ASDIR DOROTHY   


 


Propofol  1,000,000 mcg in 100 mls @ 2.449 mls/hr  01/06/18 17:45  01/10/18 10:

58





  Diprivan -  IVPB   0 mcg/kg/min





  TITR DOROTHY   0 mls/hr





  Protocol   Titration





  5 MCG/KG/MIN   


 


Norepinephrine Bitartrate 8,  500 mls @ 9.18 mls/hr  01/07/18 06:45  01/09/18 06

:46





  000 mcg/ Dextrose  IV   Not Given





  ASDIR [facility]   





  Protocol   





  0.03 MCG/KG/MIN   


 


Methylprednisolone Sodium Succinate  30 mg  01/10/18 10:00  





  Solu-Medrol -  IVPB   





  BID [facility]   








 Laboratory Last Values











WBC  6.9 K/mm3 (4.0-10.0)   01/10/18  06:25    


 


RBC  3.89 M/mm3 (4.00-5.60)  L  01/10/18  06:25    


 


Hgb  9.7 GM/dL (11.7-16.9)  L  01/10/18  06:25    


 


Hct  31.1 % (35.4-49)  L  01/10/18  06:25    


 


MCV  79.9 fl (80-96)  L  01/10/18  06:25    


 


MCH  24.9 pg (25.7-33.7)  L  01/10/18  06:25    


 


MCHC  31.2 g/dl (32.0-35.9)  L  01/10/18  06:25    


 


RDW  17.9 % (11.9-15.9)  H  01/10/18  06:25    


 


Plt Count  194 K/MM3 (134-434)   01/10/18  06:25    


 


MPV  8.1 fl (7.5-11.1)   01/10/18  06:25    


 


Neutrophils %  88.5 % (42.8-82.8)  H  01/10/18  06:25    


 


Neutrophils % (Manual)  56.6 % (42.8-82.8)   01/05/18  20:09    


 


Band Neutrophils %  0.0 %  01/05/18  20:09    


 


Lymphocytes %  5.8 % (8-40)  L D 01/10/18  06:25    


 


Lymphocytes % (Manual)  28.3 % (8-40)   01/05/18  20:09    


 


Monocytes %  5.7 % (3.8-10.2)   01/10/18  06:25    


 


Monocytes % (Manual)  3 % (3.8-10.2)  L  01/05/18  20:09    


 


Eosinophils %  0.0 % (0-4.5)   01/10/18  06:25    


 


Eosinophils % (Manual)  0.0 % (0-4.5)   01/05/18  20:09    


 


Basophils %  0.0 % (0-2.0)   01/10/18  06:25    


 


Basophils % (Manual)  0.0 % (0-2.0)   01/05/18  20:09    


 


Myelocytes % (Man)  4 % (0-2)  H  01/05/18  20:09    


 


Metamyelocytes  2 % (0-2)   01/05/18  20:09    


 


PT with INR  14.70 SEC (9.98-11.88)  H  01/05/18  20:09    


 


INR  1.30  (0.82-1.09)  H D 01/05/18  20:09    


 


PTT (Actin FS)  25.3 SECONDS (26.9-34.4)  L  01/10/18  06:25    


 


Anticoagulation Therapy  No Result Required.   01/06/18  16:50    


 


Puncture Site  Right radial   01/09/18  07:30    


 


ABG pH  7.38  (7.35-7.45)   01/09/18  07:30    


 


ABG pCO2 at Pt Temp  46.2 mmHg (35-45)  H  01/09/18  07:30    


 


ABG pO2 at Pt Temp  86.3 mmHg ()   01/09/18  07:30    


 


ABG HCO3  26.6 meq/L (22-26)  H  01/09/18  07:30    


 


ABG O2 Sat (Measured)  96.7 % (90-98.9)   01/09/18  07:30    


 


ABG O2 Content  13.2 % vol (15-22)  L  01/09/18  07:30    


 


ABG Base Excess  1.7 meq/l (-2-2)   01/09/18  07:30    


 


Jamel Test  Positive   01/09/18  07:30    


 


Carboxyhemoglobin  1.0 gm% (0.5-2.0)   01/05/18  20:15    


 


Methemoglobin  0.7 % (0.4-1.5)   01/05/18  20:15    


 


O2 Delivery Device  Vent   01/09/18  07:30    


 


Oxygen Flow Rate  30   01/09/18  07:30    


 


Vent Mode  A/c   01/08/18  09:20    


 


Vent Rate  16   01/09/18  07:30    


 


Mechanical Rate  Yes   01/09/18  07:30    


 


PEEP  8.0 cmH2O  01/09/18  07:30    


 


Pressure Support Vent  450   01/09/18  07:30    


 


Sodium  145 mmol/L (136-145)   01/10/18  06:25    


 


Potassium  4.5 mmol/L (3.5-5.1)   01/10/18  06:25    


 


Chloride  112 mmol/L ()  H  01/10/18  06:25    


 


Carbon Dioxide  27 mmol/L (21-32)   01/10/18  06:25    


 


Anion Gap  6  (8-16)  L  01/10/18  06:25    


 


BUN  37 mg/dL (7-18)  H  01/10/18  06:25    


 


Creatinine  0.8 mg/dL (0.7-1.3)   01/10/18  06:25    


 


Creat Clearance w eGFR  > 60  (>60)   01/10/18  06:25    


 


POC Glucometer  [id number] UNITS ()   01/09/18  06:30    


 


Random Glucose  163 mg/dL ()  H  01/10/18  06:25    


 


Hemoglobin A1c %  5.7 % (4.8-6.0)  D 01/06/18  05:50    


 


Lactic Acid  1.2 mmol/L (0.4-2.0)   01/06/18  08:30    


 


Calcium  8.3 mg/dL (8.5-10.1)  L  01/10/18  06:25    


 


Phosphorus  3.1 mg/dL (2.5-4.9)   01/10/18  06:25    


 


Magnesium  2.4 mg/dL (1.8-2.4)   01/10/18  06:25    


 


Total Bilirubin  0.7 mg/dL (0.2-1.0)   01/10/18  06:25    


 


AST  16 U/L (15-37)  D 01/10/18  06:25    


 


ALT  66 U/L (12-78)  D 01/10/18  06:25    


 


Alkaline Phosphatase  61 U/L ()   01/10/18  06:25    


 


Creatine Kinase  72 IU/L ()   01/06/18  03:25    


 


Troponin I  0.39 ng/ml (0.00-0.05)  H  01/06/18  08:30    


 


B-Natriuretic Peptide  1026.58 pg/ml (5-125)  H  01/05/18  20:09    


 


Total Protein  5.4 g/dl (6.4-8.2)  L  01/10/18  06:25    


 


Albumin  2.7 g/dl (3.4-5.0)  L  01/10/18  06:25    











ecg 1/5/18:  sr, pvcs, nl intervals, twi ant/lat, no st changes





cxr 1/9: minimaal increased lung markings bilaterally.  minimal blunting of the 

costophrenic angle.  





CXR: small right pleural effusion with underlying atelectasis vs. early 

infiltrate.





mibi 9/2015:  no ischemia, large nilson apical and septal scars, lvef 38%





mibi 12/2012:  large anteroapical, apical , inferior, septal scars, cannot 

exclude mild septal ischemia, lvef mildly reduced





tele: sr





echo here 1/2018: nl lv size.  severely reduced lvef.  global.  nl rv size.  no 

mention of fn.  well seated bioavr, nl fn.  no ar.  1+ mac.  mod tr rvsp 30-40.

  


Echo 1/16: very TDS; severely decr LVEF, can't assess RWMAs; RV tds; valve fxn 

WNL





Cath 2/2016 MSH: 3vd with patent LIMA-LAD and SVG-RCA





est cct 35 mins





a/p:  72 m hx cad s/p MI and cabg and bioAVR, htn, hld, ischemic cardiomyopathy

, prior VF cardiac arrest 2/2016 now s/p ICD ([place]), paroxysmal atrial 

flutter (brief episodes in setting of sepsis, no AC initiated), copd on home 02

, chronic resp failure s/p trach, dm, here with hypoxia/PEA arrest.





anoxic brain injury 2/2 PEA cardiac arrest in the setting of acute Hypercapnic 

Respiratory Failure:


-? trach dislodgement triggered sequence of events


-doubt acs here (troponins low/non-diagnostic range, nonspecific ECG changes 

obtained s/p hypoxia and cardiac arrest)


-no signs CHF 


-vent mgmt per critical care


-echo here similar to priors.  


-Patient has ICD ([place]) with routine remote checks (via EP at [facility])--if 

makes meaningful recovery, will f/u with EP regarding any detected arrhythmias 

at time of event


-monitor tele, as doing





HTN:


-has been labile here.  both high ? secondary to increased ICP, and transiently 

low requiring pressors - now off.  bp now stable off anti-htn. 


 


COPD/chronic resp failure:


-s/p trach, with mechanical ventilation requirement





cad with h/o mi, cabg:


- ACS not likely here, as above


- no obstructive dz at cath 2/16


- on statin, asa 





ischemic cardiomyopathy with h/o VF s/p ICD


- held BB, ACE here while acutely hypotensive


- low threshold to resume if bp remains elevated/stable


- appears clinically euvolemic-- defer diuresis for now. 


- has primary prevn ICD, monitor tele


- electrolyte repletion prn. 





parox atrial flutter on prior admission with sepsis


- episodes were brief and patient was not placed on long term anticoagulation.


- remains in sr here.   


 


s/p bioAVR


-done in 2013 at time of CABG


-TDS echo in past (not helpful for valve fxn)--> echo here --> well seated, no 

as or ar.

## 2018-01-10 NOTE — CONSULT
Consult


Consult Specialty:: Nephrology


Referred by:: Dr Garcia


Reason for Consultation:: azotemia





- History of Present Illness


Chief Complaint: s/p arrest


History of Present Illness: 





Pt is a 72 year old male with pmhx of chronic resp failure with trache, copd, dm

, cad, cabg, htn, and chol who was brought to the ER after pt had desaturated. 

There was a problem with his trache cuff. Pt did have a cardiac arrest. He 

required epi, amio and bicarb. Pt had been unresponsive. I was called to 

evaluate him for azotemia. Family are at bedside and also assisted with 

history. I reviewed the ICU chart. 





- History Source


History Provided By: Family Member, Medical Record


Limitations to Obtaining History: Clinical Condition





- Past Medical History


Cardio/Vascular: Yes: Aortic Insufficiency, CAD (MI in 2013 -> CABG ), HTN, 

Hyperlipdemia, MI, Other (s/p aortic valve replacement in 2013)


Pulmonary: Yes: COPD, O2 Dependent (since 2013), Other (trache and vent 

dependent)





- Past Surgical History


Past Surgical History: Yes: AICD, CABG (in 2013), Cholecystectomy, Hernia 

Repair (right groin), Valve Replacement (in 2013)





- Alcohol/Substance Use


Hx Alcohol Use: No


History of Substance Use: reports: None





- Smoking History


Smoking history: Unknown if ever smoked


Have you smoked in the past 12 months: No


If you are a former smoker, when did you quit?: 2013





- Social History


Usual Living Arrangement: Other ([facility])


ADL: Support Services


Occupation: retired 


History of Recent Travel: No





Home Medications





- Allergies


Allergies/Adverse Reactions: 


 Allergies











Allergy/AdvReac Type Severity Reaction Status Date / Time


 


No Known Allergies Allergy   Verified 01/06/18 07:15














- Home Medications


Home Medications: 


Ambulatory Orders





Pantoprazole Sodium [Protonix -] 20 mg PO DAILY 04/20/16 


Acetaminophen [Tylenol .Regular Strength -] 650 mg PO Q6H PRN #0 tablet 11/10/

16 


Albuterol 2.5/Ipratropium 0.5 [Duoneb -] 1 amp NEB Q6H PRN #0 amp 11/10/16 


Alprazolam [Xanax] 0.25 mg PO Q8H PRN #90 tablet MDD 3 11/10/16 


Amino Acids/Protein Hydrolys [Prostat Sugar-Free Packet -] 30 ml PO BID@0800,

1730 #60 packet 11/10/16 


Aspirin [ASA -] 81 mg PO DAILY #30 tab.chew 11/10/16 


Atorvastatin Ca [Lipitor] 10 mg PO HS #30 tablet 11/10/16 


Cefuroxime Axetil [Ceftin -] 500 mg PO BID #4 tablet 11/10/16 


Ferrous Sulfate [Ferosul] 300 mg PO DAILY #1 bottle 11/10/16 


Furosemide [Lasix -] 20 mg PO BID@0600,1400 #60 tablet 11/10/16 


Insulin Detemir [Levemir Flextouch] 5 unit SQ DAILY #1 syringe 11/10/16 


Isopropyl Alcohol [Alcoh-Wipe] 1 each MC BID #1 box 11/10/16 


Lancets/Blood Glucose Strips [Fora Z46-T42-N62-I09 Strp-Lnct] 1 each MC BID #1 

combo..pkg 11/10/16 


Metformin HCl [Glucophage -] 500 mg PO BID@0700,1630 #60 tablet 11/10/16 


Metoprolol Succinate [Toprol XL -] 12.5 mg PO BID #60 tab.sr.24h 11/10/16 


Miscellaneous Medical Supply [Glucometer Device] 1 each SCJ BID #1 kit 11/10/16 


Multivitamins [Multivit ([facility] Formulary)] 1 tab PO DAILY #30 tab 11/10/16 


Prednisone [Deltasone -] 7.5 mg PO DAILY #30 tablet 11/10/16 


Sennosides [Senna -] 1 tab PO BID #60 tablet 11/10/16 


Tamsulosin HCl [Flomax -] 0.4 mg PO DAILY@0830 #30 cap.er.24h 11/10/16 











Family Disease History





- Family Disease History


Family Disease History: Other: Son (alive and well)





Review of Systems


Unable to obtain ROS, reason: pt sedated





Physical Exam


Vital Signs: 


 Vital Signs











Temperature  98.3 F   01/10/18 10:00


 


Pulse Rate  64   01/10/18 12:00


 


Respiratory Rate  12   01/10/18 12:15


 


Blood Pressure  120/63   01/10/18 12:00


 


O2 Sat by Pulse Oximetry (%)  98   01/10/18 09:54











Constitutional: Yes: Calm


Eyes: Yes: Conjunctiva Clear


Neck: Yes: Other (trache)


Respiratory: Yes: Mechanically Ventilated


Gastrointestinal: Yes: Soft


Renal/: Yes: Lion Present


Musculoskeletal: Yes: Muscle Weakness


Edema: Yes


Edema: LUE: Trace, RUE: Trace, LLE: 1+, RLE: 1+


Neurological: Yes: Lethargy


Labs: 


 CBC, BMP





 01/10/18 06:25 





 01/10/18 06:25 





 Laboratory Tests











  01/05/18 01/05/18 01/06/18





  20:09 22:52 08:30


 


Hgb   


 


ABG pH   


 


ABG pCO2 at Pt Temp   


 


ABG pO2 at Pt Temp   


 


ABG HCO3   


 


Sodium   


 


Potassium   


 


Chloride   


 


Carbon Dioxide   


 


Anion Gap   


 


Creatinine   


 


Lactic Acid  5.4 H*  3.9 H*  1.2














  01/09/18 01/09/18 01/10/18





  05:20 07:30 06:25


 


Hgb  10.2 L  


 


ABG pH   7.38 


 


ABG pCO2 at Pt Temp   46.2 H 


 


ABG pO2 at Pt Temp   86.3 


 


ABG HCO3   26.6 H 


 


Sodium    145


 


Potassium    4.5


 


Chloride    112 H


 


Carbon Dioxide    27


 


Anion Gap    6 L


 


Creatinine    0.8


 


Lactic Acid   














  01/10/18





  06:25


 


Hgb  9.7 L


 


ABG pH 


 


ABG pCO2 at Pt Temp 


 


ABG pO2 at Pt Temp 


 


ABG HCO3 


 


Sodium 


 


Potassium 


 


Chloride 


 


Carbon Dioxide 


 


Anion Gap 


 


Creatinine 


 


Lactic Acid 














Imaging





- Results


Chest X-ray: Report Reviewed





Problem List





- Problems


(1) Azotemia


Code(s): R79.89 - OTHER SPECIFIED ABNORMAL FINDINGS OF BLOOD CHEMISTRY   





(2) Cardiac arrest


Code(s): I46.9 - CARDIAC ARREST, CAUSE UNSPECIFIED   





Assessment/Plan





 Current Medications











Generic Name Dose Route Start Last Admin





  Trade Name Freq  PRN Reason Stop Dose Admin


 


Albuterol/Ipratropium  1 amp  01/10/18 20:00  





  Duoneb -  NEB   





  RQID DOROTHY   


 


Aspirin  81 mg  01/11/18 10:00  





  Asa -  NGT   





  DAILY DOROTHY   


 


Atorvastatin Calcium  10 mg  01/10/18 22:00  





  Lipitor -  NGT   





  HS DOROTHY   


 


Heparin Sodium (Porcine)  5,000 unit  01/10/18 22:00  





  Heparin -  SQ   





  TID DOROTHY   


 


Methylprednisolone Sodium Succinate  60 mg  01/10/18 14:45  01/10/18 17:50





  Solu-Medrol -  IVPB   60 mg





  BID DOROTHY   Administration


 


Morphine Sulfate  2 mg  01/10/18 14:41  





  Morphine Sulfate  IVPUSH   





  Q3H PRN   





  ANXIETY   


 


Pantoprazole Sodium  40 mg  01/11/18 10:00  





  Protonix Packets For Oral Suspension -  NGT   





  DAILY DOROTHY   











Impression


1. s/p cardiac arrest


2. chronic respiratory failure


3. history of cardiac arrest in the past as well


4. anemia


5. hyperlipidemia


6. aoritc valve disease


7. anxiety


8. urinary retention


9. insomnia


10. DM


11. a-flutter


12. transaminitis


13. azotemia





Plan


- cont with tube feeds


- steroids can contribute to elevated bun


- consider increasing free water with feeds


- repeat labs in am


- evaluate mental status off of sedation


- discussed with family


- discussed with medical attending


- check ua


Dr Thakkar

## 2018-01-10 NOTE — PN
Physical Exam: 


SUBJECTIVE: Patient seen and examined. Tracheostomy tube in place. Was sedated 

overnight for agitation. Seen this am, difficult to arouse.








OBJECTIVE:





 Vital Signs











 Period  Temp  Pulse  Resp  BP Sys/Regalado  Pulse Ox


 


 Last 24 Hr  98 F-99.1 F  50-74  14-30  117-141/57-76  95-98





 Intake & Output











 01/07/18 01/08/18 01/09/18 01/10/18





 23:59 23:59 23:59 23:59


 


Intake Total 3366 2480 1227.6 160


 


Output Total 1400 


 


Balance 1966 1580 377.6 -1040














GENERAL: The patient is intubated with tracheostomy- VCV-AC- fi02-30%, TV-400, 7

/5, RR-14.


HEAD: OGT in place


EYES: Sluggishly reacting pupils bilaterally


ENT: tracheostomy tube in situ, dry moist mucous membranes, absent teeth.


NECK: tracheostomy tube in situ, RIJ in situ. 


LUNGS:Bilateral breath sounds, 


HEART: Regular rate and rhythm, S1, S2 , 


ABDOMEN: Soft,nondistended, bowel sounds present


EXTREMITIES:LUE peripheral line, no pedal edema  


NEUROLOGICAL: Taken off sedation in am, mental status still difficult to assess


PSYCH: Unable to assess


Lines:tracheostomy,RIJ, Lion, NGT, 





 CBC, BMP





 01/10/18 06:25 





 01/10/18 06:25 

















 Laboratory Results - last 24 hr











  01/09/18 01/10/18





  07:30 06:25


 


WBC   6.9


 


RBC   3.89 L


 


Hgb   9.7 L


 


Hct   31.1 L


 


MCV   79.9 L


 


MCH   24.9 L


 


MCHC   31.2 L


 


RDW   17.9 H


 


Plt Count   194


 


MPV   8.1


 


Neutrophils %   88.5 H


 


Lymphocytes %   5.8 L D


 


Monocytes %   5.7


 


Eosinophils %   0.0


 


Basophils %   0.0


 


Puncture Site  Right radial 


 


ABG pH  7.38 


 


ABG pCO2 at Pt Temp  46.2 H 


 


ABG pO2 at Pt Temp  86.3 


 


ABG HCO3  26.6 H 


 


ABG O2 Sat (Measured)  96.7 


 


ABG O2 Content  13.2 L 


 


ABG Base Excess  1.7 


 


Jamel Test  Positive 


 


O2 Delivery Device  Vent 


 


Oxygen Flow Rate  30 


 


Vent Rate  16 


 


Mechanical Rate  Yes 


 


PEEP  8.0 


 


Pressure Support Vent  450 








Active Medications











Generic Name Dose Route Start Last Admin





  Trade Name Freq  PRN Reason Stop Dose Admin


 


Albuterol/Ipratropium  1 amp  01/06/18 17:06  01/09/18 05:35





  Duoneb -  NEB   1 amp





  Q30M PRN   Administration





  SHORTNESS OF BREATH   


 


Albuterol/Ipratropium  1 amp  01/09/18 20:00  





  Duoneb -  NEB   





  RQID DOROTHY   


 


Aspirin  81 mg  01/06/18 10:00  01/09/18 10:39





  Asa -  NGT   81 mg





  DAILY DOROTHY   Administration


 


Atorvastatin Calcium  10 mg  01/06/18 22:00  01/09/18 21:51





  Lipitor -  NGT   10 mg





  HS DOROTHY   Administration


 


Heparin Sodium (Porcine)  5,000 unit  01/09/18 15:00  01/10/18 06:41





  Heparin -  SQ   5,000 unit





  TID DOROTHY   Administration


 


Sodium Chloride  1,000 mls @ 100 mls/hr  01/06/18 06:00  01/10/18 06:26





  Normal Saline -  IV   Not Given





  ASDIR DOROTHY   


 


Propofol  1,000,000 mcg in 100 mls @ 2.449 mls/hr  01/06/18 17:45  01/10/18 06:

40





  Diprivan -  IVPB   5 mcg/kg/min





  TITR DOROTHY   2.449 mls/hr





  Protocol   Titration





  5 MCG/KG/MIN   


 


Norepinephrine Bitartrate 8,  500 mls @ 9.18 mls/hr  01/07/18 06:45  01/09/18 06

:46





  000 mcg/ Dextrose  IV   Not Given





  ASDIR DOROTHY   





  Protocol   





  0.03 MCG/KG/MIN   


 


Methylprednisolone Sodium Succinate  60 mg  01/09/18 10:00  01/09/18 21:50





  Solu-Medrol -  IVPB   60 mg





  BID DOROTHY   Administration











ASSESSMENT/PLAN:


73 y/o M w/ PMH on tracheostomy, COPD, DM2, MI s/p CABG with Ao valve 

replacement, s/p PPM, HTN, HLD previous hx of cardiac arrest presented with 

hypoxia, s/p dislodged trach 





Neuro:


   AMS 2/2 anoxic brain injury s/p cardiac arrest


   Off Sedation for mental status assessment, no improvement noted


   no improvement in alertness noted today by son at bedside


   Only ivpush  morphine 2mg Q3PRN (for agitation)   


   Monitor


   


Cardio:


   PEA cardiac arrest


   CAD hx-s/p CABG with Ao valve replacement, s/p PPM


   HTN


   HLD


   Hypotension 


   Atorvastatin 10mg HS NGT


   off pressors


   ASA 81mg NGT daily


   Echo


   





Respiratory:


   Acute on Chronic hypoxic, hypercapnic respiratory failure


   COPD R/O exacerbation


   Vent settings changed to-RR-12, TV-450 (based on home vent settings given by 

son)


   Albuterol neb-daily


   Duonebs-1amp neb Q6H


   Iv methylprednisolone  iv 60mg 12hrly 


   Elevate HOB


   Aspiration precautions


   Significant secretions


   CXR- improving





Metab/Fluids/Lines/GI:


   DM-


   BGMs


   ISS


   Monitor


   Stop IV Normal saline @100ml/hr


   tracheostomy,RIJ, Lion, NGT, 


   CMP, Mg, Phosphorus


   Tube Feeding- NGT replaced-Glucerna 1.5 @20cc


   Oral hygiene


ID:


   Follow cultures- blood cultures- no growth after 96hrs





Prophylaxis:


   iv Protonix 40mg bid


   Bilateral SCDs


   Heparin SQ 5000iu tid





Dispo:


   Transfer to [facility]





Visit type





- Emergency Visit


Emergency Visit: Yes


ED Registration Date: 01/05/18


Care time: The patient presented to the Emergency Department on the above date 

and was hospitalized for further evaluation of their emergent condition.





- New Patient


This patient is new to me today: No





- Critical Care


Critical Care patient: Yes


Total Critical Care Time (in minutes): 35


Critical Care Statement: The care of this patient involved high complexity 

decision making to prevent further life threatening deterioration of the patient

's condition and/or to evaluate & treat vital organ system(s) failure or risk 

of failure.

## 2018-01-11 LAB
ALBUMIN SERPL-MCNC: 3 G/DL (ref 3.4–5)
ALP SERPL-CCNC: 66 U/L (ref 45–117)
ALT SERPL-CCNC: 83 U/L (ref 12–78)
ANION GAP SERPL CALC-SCNC: 4 MMOL/L (ref 8–16)
APPEARANCE UR: (no result)
ARTERIAL BLOOD GAS PCO2: 55.4 MMHG (ref 35–45)
ARTERIAL PATENCY WRIST A: POSITIVE
AST SERPL-CCNC: 26 U/L (ref 15–37)
BASE EXCESS BLDA CALC-SCNC: 2.4 MEQ/L (ref -2–2)
BASOPHILS # BLD: 0 % (ref 0–2)
BILIRUB SERPL-MCNC: 0.8 MG/DL (ref 0.2–1)
BILIRUB UR STRIP.AUTO-MCNC: NEGATIVE MG/DL
BUN SERPL-MCNC: 39 MG/DL (ref 7–18)
CALCIUM SERPL-MCNC: 8.4 MG/DL (ref 8.5–10.1)
CHLORIDE SERPL-SCNC: 113 MMOL/L (ref 98–107)
CO2 SERPL-SCNC: 29 MMOL/L (ref 21–32)
COLOR UR: (no result)
CREAT SERPL-MCNC: 0.9 MG/DL (ref 0.7–1.3)
DEPRECATED RDW RBC AUTO: 18.7 % (ref 11.9–15.9)
EOSINOPHIL # BLD: 0 % (ref 0–4.5)
EPITH CASTS URNS QL MICRO: (no result) /HPF
GLUCOSE SERPL-MCNC: 200 MG/DL (ref 74–106)
HCT VFR BLD CALC: 34.6 % (ref 35.4–49)
HGB BLD-MCNC: 10.8 GM/DL (ref 11.7–16.9)
HYALINE CASTS URNS QL MICRO: 1 /LPF
KETONES UR QL STRIP: (no result)
LEUKOCYTE ESTERASE UR QL STRIP.AUTO: (no result)
LYMPHOCYTES # BLD: 7 % (ref 8–40)
MAGNESIUM SERPL-MCNC: 2.6 MG/DL (ref 1.8–2.4)
MCH RBC QN AUTO: 24.7 PG (ref 25.7–33.7)
MCHC RBC AUTO-ENTMCNC: 31.1 G/DL (ref 32–35.9)
MCV RBC: 79.3 FL (ref 80–96)
MONOCYTES # BLD AUTO: 7.8 % (ref 3.8–10.2)
MUCOUS THREADS URNS QL MICRO: (no result)
NEUTROPHILS # BLD: 85.2 % (ref 42.8–82.8)
NITRITE UR QL STRIP: NEGATIVE
PH UR: 5 [PH] (ref 5–8)
PHOSPHATE SERPL-MCNC: 2.5 MG/DL (ref 2.5–4.9)
PLATELET # BLD AUTO: 209 K/MM3 (ref 134–434)
PMV BLD: 8.1 FL (ref 7.5–11.1)
PO2 BLDA: 244 MMHG (ref 70–100)
POTASSIUM SERPLBLD-SCNC: 4.5 MMOL/L (ref 3.5–5.1)
PROT SERPL-MCNC: 6.1 G/DL (ref 6.4–8.2)
PROT UR QL STRIP: (no result)
PROT UR QL STRIP: NEGATIVE
RBC # BLD AUTO: 4.36 M/MM3 (ref 4–5.6)
RBC # UR STRIP: (no result) /UL
SAO2 % BLDA: 99.7 % (ref 90–98.9)
SODIUM SERPL-SCNC: 146 MMOL/L (ref 136–145)
SP GR UR: 1.01 (ref 1–1.03)
UROBILINOGEN UR STRIP-MCNC: NEGATIVE MG/DL (ref 0.2–1)
WBC # BLD AUTO: 7.7 K/MM3 (ref 4–10)

## 2018-01-11 RX ADMIN — METHYLPREDNISOLONE SODIUM SUCCINATE SCH MG: 125 INJECTION, POWDER, FOR SOLUTION INTRAMUSCULAR; INTRAVENOUS at 21:27

## 2018-01-11 RX ADMIN — HEPARIN SODIUM SCH UNIT: 5000 INJECTION, SOLUTION INTRAVENOUS; SUBCUTANEOUS at 21:27

## 2018-01-11 RX ADMIN — IPRATROPIUM BROMIDE AND ALBUTEROL SULFATE SCH AMP: .5; 3 SOLUTION RESPIRATORY (INHALATION) at 16:00

## 2018-01-11 RX ADMIN — IPRATROPIUM BROMIDE AND ALBUTEROL SULFATE SCH AMP: .5; 3 SOLUTION RESPIRATORY (INHALATION) at 20:00

## 2018-01-11 RX ADMIN — MORPHINE SULFATE PRN MG: 10 INJECTION, SOLUTION INTRAMUSCULAR; INTRAVENOUS at 12:00

## 2018-01-11 RX ADMIN — HEPARIN SODIUM SCH UNIT: 5000 INJECTION, SOLUTION INTRAVENOUS; SUBCUTANEOUS at 14:39

## 2018-01-11 RX ADMIN — PANTOPRAZOLE SODIUM SCH MG: 40 GRANULE, DELAYED RELEASE ORAL at 09:54

## 2018-01-11 RX ADMIN — METHYLPREDNISOLONE SODIUM SUCCINATE SCH MG: 125 INJECTION, POWDER, FOR SOLUTION INTRAMUSCULAR; INTRAVENOUS at 09:54

## 2018-01-11 RX ADMIN — HEPARIN SODIUM SCH UNIT: 5000 INJECTION, SOLUTION INTRAVENOUS; SUBCUTANEOUS at 05:32

## 2018-01-11 RX ADMIN — MORPHINE SULFATE PRN MG: 10 INJECTION, SOLUTION INTRAMUSCULAR; INTRAVENOUS at 18:02

## 2018-01-11 RX ADMIN — ATORVASTATIN CALCIUM SCH MG: 10 TABLET, FILM COATED ORAL at 21:28

## 2018-01-11 RX ADMIN — MORPHINE SULFATE PRN MG: 10 INJECTION, SOLUTION INTRAMUSCULAR; INTRAVENOUS at 23:30

## 2018-01-11 RX ADMIN — ASPIRIN 81 MG SCH MG: 81 TABLET ORAL at 09:54

## 2018-01-11 RX ADMIN — IPRATROPIUM BROMIDE AND ALBUTEROL SULFATE SCH AMP: .5; 3 SOLUTION RESPIRATORY (INHALATION) at 08:55

## 2018-01-11 RX ADMIN — IPRATROPIUM BROMIDE AND ALBUTEROL SULFATE SCH AMP: .5; 3 SOLUTION RESPIRATORY (INHALATION) at 12:16

## 2018-01-11 NOTE — PN
Progress Note, Physician


Chief Complaint: 





UNRESPONSIVE TO VERBAL STIMULI


FAMILY BEDSIDE





- Current Medication List


Current Medications: 


Active Medications





Albuterol/Ipratropium (Duoneb -)  1 amp NEB RQID [facility]


   Last Admin: 01/11/18 16:00 Dose:  1 amp


Aspirin (Asa -)  81 mg NGT DAILY [facility]


   Last Admin: 01/11/18 09:54 Dose:  81 mg


Atorvastatin Calcium (Lipitor -)  10 mg NGT HS [facility]


   Last Admin: 01/10/18 21:42 Dose:  10 mg


Heparin Sodium (Porcine) (Heparin -)  5,000 unit SQ TID [facility]


   Last Admin: 01/11/18 14:39 Dose:  5,000 unit


Methylprednisolone Sodium Succinate (Solu-Medrol -)  60 mg IVPB BID [facility]


   Last Admin: 01/11/18 09:54 Dose:  60 mg


Morphine Sulfate (Morphine Injection -)  2 mg IVPUSH Q3H PRN


   PRN Reason: ANXIETY


   Last Admin: 01/11/18 18:02 Dose:  2 mg


Pantoprazole Sodium (Protonix Packets For Oral Suspension -)  40 mg NGT DAILY 

[facility] hospital


   Last Admin: 01/11/18 09:54 Dose:  40 mg











- Objective


Vital Signs: 


 Vital Signs











Temperature  99.9 F H  01/11/18 14:47


 


Pulse Rate  103 H  01/11/18 14:47


 


Respiratory Rate  14   01/11/18 17:34


 


Blood Pressure  150/83   01/11/18 14:47


 


O2 Sat by Pulse Oximetry (%)  97   01/11/18 12:16











Constitutional: Yes: Severe Distress


Eyes: Yes: Other


HENT: Yes: Other


Neck: Yes: WNL


Cardiovascular: Yes: WNL


Respiratory: Yes: Mechanically Ventilated, Orthopnea, Poor Air Entry


Gastrointestinal: Yes: WNL


Genitourinary: Yes: Hematuria


Musculoskeletal: Yes: Muscle Weakness


Edema: Yes


Peripheral Pulses WNL: Yes


Integumentary: Yes: WNL


Wound/Incision: Yes: Clean/Dry


Neurological: Yes: Unresponsive


...Motor Strength: LLE, RLE


Psychiatric: Yes: Other


Labs: 


 CBC, BMP





 01/11/18 06:50 





 01/11/18 06:50 





 INR, PTT











INR  1.30  (0.82-1.09)  H D 01/05/18  20:09    














Problem List





- Problems


(1) Anoxia


Code(s): R09.02 - HYPOXEMIA   





(2) Cardiac arrest


Code(s): I46.9 - CARDIAC ARREST, CAUSE UNSPECIFIED   





(3) DVT prophylaxis


Code(s): NFK8800 -    





(4) Acute and chronic respiratory failure with hypercapnia


Code(s): J96.22 - ACUTE AND CHRONIC RESPIRATORY FAILURE WITH HYPERCAPNIA   





(5) Acute hyperkalemia


Code(s): E87.5 - HYPERKALEMIA   





(6) Acute on chronic systolic (congestive) heart failure


Code(s): I50.23 - ACUTE ON CHRONIC SYSTOLIC (CONGESTIVE) HEART FAILURE   





(7) Acute renal failure (ARF)


Code(s): N17.9 - ACUTE KIDNEY FAILURE, UNSPECIFIED   


Qualifiers: 


   Acute renal failure type: unspecified   Qualified Code(s): N17.9 - Acute 

kidney failure, unspecified   





(8) Anemia


Code(s): D64.9 - ANEMIA, UNSPECIFIED   


Qualifiers: 


   Anemia type: other cause   Other causes of anemia: chronic disease, other   

Qualified Code(s): D63.8 - Anemia in other chronic diseases classified 

elsewhere   





Assessment/Plan





VENT SUPPORT


WEAN AS TOLERATED


NEUROLOGY EVAL


POOR PROGNOSIS


PULM EVAL

## 2018-01-11 NOTE — PN
Progress Note, Physician


History of Present Illness: 





Pt seen and examined at bedside. He is still not responsive. 





- Current Medication List


Current Medications: 


Active Medications





Albuterol/Ipratropium (Duoneb -)  1 amp NEB RQID [facility]


   Last Admin: 01/11/18 16:00 Dose:  1 amp


Aspirin (Asa -)  81 mg NGT DAILY [facility]


   Last Admin: 01/11/18 09:54 Dose:  81 mg


Atorvastatin Calcium (Lipitor -)  10 mg NGT HS [facility]


   Last Admin: 01/10/18 21:42 Dose:  10 mg


Heparin Sodium (Porcine) (Heparin -)  5,000 unit SQ TID [facility]


   Last Admin: 01/11/18 14:39 Dose:  5,000 unit


Methylprednisolone Sodium Succinate (Solu-Medrol -)  60 mg IVPB BID [facility]


   Last Admin: 01/11/18 09:54 Dose:  60 mg


Morphine Sulfate (Morphine Injection -)  2 mg IVPUSH Q3H PRN


   PRN Reason: ANXIETY


   Last Admin: 01/11/18 18:02 Dose:  2 mg


Pantoprazole Sodium (Protonix Packets For Oral Suspension -)  40 mg NGT DAILY 

[facility]


   Last Admin: 01/11/18 09:54 Dose:  40 mg











- Objective


Vital Signs: 


 Vital Signs











Temperature  99.9 F H  01/11/18 14:47


 


Pulse Rate  103 H  01/11/18 14:47


 


Respiratory Rate  14   01/11/18 17:34


 


Blood Pressure  150/83   01/11/18 14:47


 


O2 Sat by Pulse Oximetry (%)  97   01/11/18 12:16











Constitutional: Yes: Calm


Eyes: Yes: Conjunctiva Clear


Neck: Yes: Other (trache)


Cardiovascular: Yes: S1, S2


Respiratory: Yes: Mechanically Ventilated


Gastrointestinal: Yes: Soft


Genitourinary: Yes: Incontinence


Musculoskeletal: Yes: Muscle Weakness


Edema: Yes


Neurological: Yes: Lethargy


Labs: 


 CBC, BMP





 01/11/18 06:50 





 01/11/18 06:50 





 INR, PTT











INR  1.30  (0.82-1.09)  H D 01/05/18  20:09    














Problem List





- Problems


(1) Azotemia


Code(s): R79.89 - OTHER SPECIFIED ABNORMAL FINDINGS OF BLOOD CHEMISTRY   





(2) Cardiac arrest


Code(s): I46.9 - CARDIAC ARREST, CAUSE UNSPECIFIED   





Assessment/Plan


 Current Medications











Generic Name Dose Route Start Last Admin





  Trade Name Freq  PRN Reason Stop Dose Admin


 


Albuterol/Ipratropium  1 amp  01/10/18 20:00  01/11/18 16:00





  Duoneb -  NEB   1 amp





  RQID DOROTHY   Administration


 


Aspirin  81 mg  01/11/18 10:00  01/11/18 09:54





  Asa -  NGT   81 mg





  DAILY DOROTHY   Administration


 


Atorvastatin Calcium  10 mg  01/10/18 22:00  01/10/18 21:42





  Lipitor -  NGT   10 mg





  HS DOROTHY   Administration


 


Heparin Sodium (Porcine)  5,000 unit  01/10/18 22:00  01/11/18 14:39





  Heparin -  SQ   5,000 unit





  TID DOROTHY   Administration


 


Methylprednisolone Sodium Succinate  60 mg  01/10/18 14:45  01/11/18 09:54





  Solu-Medrol -  IVPB   60 mg





  BID DOROTHY   Administration


 


Morphine Sulfate  2 mg  01/11/18 11:52  01/11/18 18:02





  Morphine Injection -  IVPUSH   2 mg





  Q3H PRN   Administration





  ANXIETY   


 


Pantoprazole Sodium  40 mg  01/11/18 10:00  01/11/18 09:54





  Protonix Packets For Oral Suspension -  NGT   40 mg





  DAILY DOROTHY   Administration











Impression


1. s/p cardiac arrest


2. chronic respiratory failure


3. history of cardiac arrest in the past as well


4. anemia


5. hyperlipidemia


6. aoritc valve disease


7. anxiety


8. urinary retention


9. insomnia


10. DM


11. a-flutter


12. transaminitis


13. azotemia


14. hypernatremia





Plan


- will increase free water with tube feeds


- repeat labs in am


- discussed with family


- cont to asses mental status


- steroid taper per pulmonary





Dr Thakkar

## 2018-01-11 NOTE — PN
Progress Note (short form)





- Note


Progress Note: 


s:  on vent. no overnight events





o:


 


 


 Vital Signs











Temp  98.8 F   01/11/18 09:00


 


Pulse  76   01/11/18 09:00


 


Resp  20   01/11/18 09:00


 


BP  153/91   01/11/18 09:00


 


Pulse Ox  96   01/10/18 19:48








 Intake & Output











 01/10/18 01/10/18 01/11/18





 11:59 23:59 11:59


 


Intake Total 160 484.8 480


 


Output Total 300 1350 400


 


Balance -140 -865.2 80


 


Intake:   


 


   404.8 


 


    DIPRIVAN - 1,000,000 mcg 40 4.8 





    In 100 ml @ 5 MCG/KG/MIN   





    2.449 mls/hr IVPB TITR   





    [facility] Rx#:[id number]   


 


    Normal Saline - 1,000 ml 120 400 





    @ 100 mls/hr IV ASDIR DOROTHY   





    Rx#:[id number]   


 


  Tube Feeding  40 240


 


  Tube Irrigant  40 240


 


Output:   


 


  Urine 300 1350 400


 


    Ilon 300 1350 400


 


Other:   


 


  Voiding Method Indwelling Catheter Indwelling Catheter 


 


  Bowel Movement  No No











NAD, calm, on mechanical ventilation


JVD flat, neck supple


diminished bs


RRR nl s1, s2. no m/r/g


+ bs soft, no distension


no le e/c/c


pos dp/pt


no jaundice, diaphoresis


sedated





 


 


 


 


 Current Medications











Generic Name Dose Route Start Last Admin





  Trade Name Freq  PRN Reason Stop Dose Admin


 


Albuterol/Ipratropium  1 amp  01/10/18 20:00  01/11/18 08:55





  Duoneb -  NEB   1 amp





  RQID DOROTHY   Administration


 


Aspirin  81 mg  01/11/18 10:00  01/11/18 09:54





  Asa -  NGT   81 mg





  DAILY DOROTHY   Administration


 


Atorvastatin Calcium  10 mg  01/10/18 22:00  01/10/18 21:42





  Lipitor -  NGT   10 mg





  HS DOROTHY   Administration


 


Heparin Sodium (Porcine)  5,000 unit  01/10/18 22:00  01/11/18 05:32





  Heparin -  SQ   5,000 unit





  TID DOROTHY   Administration


 


Methylprednisolone Sodium Succinate  60 mg  01/10/18 14:45  01/11/18 09:54





  Solu-Medrol -  IVPB   60 mg





  BID DOROTHY   Administration


 


Morphine Sulfate  2 mg  01/11/18 11:52  





  Morphine Injection -  IVPUSH   





  Q3H PRN   





  ANXIETY   


 


Pantoprazole Sodium  40 mg  01/11/18 10:00  01/11/18 09:54





  Protonix Packets For Oral Suspension -  NGT   40 mg





  DAILY DOROTHY   Administration








 CBC, BMP





 01/11/18 06:50 





 01/11/18 06:50 








ecg 1/5/18:  sr, pvcs, nl intervals, twi ant/lat, no st changes





cxr 1/9: minimaal increased lung markings bilaterally.  minimal blunting of the 

costophrenic angle.  





CXR: small right pleural effusion with underlying atelectasis vs. early 

infiltrate.





mibi 9/2015:  no ischemia, large nilson apical and septal scars, lvef 38%





mibi 12/2012:  large anteroapical, apical , inferior, septal scars, cannot 

exclude mild septal ischemia, lvef mildly reduced








echo here 1/2018: nl lv size.  severely reduced lvef.  global.  nl rv size.  no 

mention of fn.  well seated bioavr, nl fn.  no ar.  1+ mac.  mod tr rvsp 30-40.

  


Echo 1/16: very TDS; severely decr LVEF, can't assess RWMAs; RV tds; valve fxn 

WNL





Cath 2/2016 MSH: 3vd with patent LIMA-LAD and SVG-RCA











a/p:  72 m hx cad s/p MI and cabg and bioAVR, htn, hld, ischemic cardiomyopathy

, prior VF cardiac arrest 2/2016 now s/p ICD ([place]), paroxysmal atrial 

flutter (brief episodes in setting of sepsis, no AC initiated), copd on home 02

, chronic resp failure s/p trach, dm, here with hypoxia/PEA arrest.





anoxic brain injury 2/2 PEA cardiac arrest in the setting of acute Hypercapnic 

Respiratory Failure:


-? trach dislodgement triggered sequence of events


-doubt acs here (troponins low/non-diagnostic range, nonspecific ECG changes 

obtained s/p hypoxia and cardiac arrest)


-no signs CHF 


-vent mgmt per critical care


-echo here similar to priors.  


-Patient has ICD ([place]) with routine remote checks (via EP at [facility])--if 

makes meaningful recovery, will f/u with EP regarding any detected arrhythmias 

at time of event





HTN:


-has been labile here.  both high ? secondary to increased ICP, and transiently 

low requiring pressors - now off.  bp now stable off anti-htn. 


 


COPD/chronic resp failure:


-s/p trach, with mechanical ventilation requirement





cad with h/o mi, cabg:


- ACS not likely here, as above


- no obstructive dz at cath 2/16


- on statin, asa 





ischemic cardiomyopathy with h/o VF s/p ICD


- held BB, ACE here while acutely hypotensive


- low threshold to resume if bp remains elevated/stable


- appears clinically euvolemic-- defer diuresis for now. 


- has primary prevn ICD


- electrolyte repletion prn. 





parox atrial flutter on prior admission with sepsis


- episodes were brief and patient was not placed on long term anticoagulation.


- remains in sr here.   


 


s/p bioAVR


-done in 2013 at time of CABG


-TDS echo in past (not helpful for valve fxn)--> echo here --> well seated, no 

as or ar.

## 2018-01-11 NOTE — PN
Progress Note (short form)





- Note


Progress Note: 





PULMONARY





Vented, unresponsive, tachypneic. Son at bedside.





 Last Vital Signs











Temp Pulse Resp BP Pulse Ox


 


 98.8 F   76   20   153/91   96 


 


 01/11/18 09:00  01/11/18 09:00  01/11/18 09:00  01/11/18 09:00  01/10/18 19:48








Gen:  vented, tachypneic


Heart:  RRR


Lung:  distant breath sounds, no wheezes


Abd: soft, nontender


Ext: no edema





 CBC, BMP





 01/11/18 06:50 





 01/11/18 06:50 





Active Medications





Albuterol/Ipratropium (Duoneb -)  1 amp NEB RQID [facility]


   Last Admin: 01/11/18 08:55 Dose:  1 amp


Aspirin (Asa -)  81 mg NGT DAILY [facility]


   Last Admin: 01/11/18 09:54 Dose:  81 mg


Atorvastatin Calcium (Lipitor -)  10 mg NGT HS [facility]


   Last Admin: 01/10/18 21:42 Dose:  10 mg


Heparin Sodium (Porcine) (Heparin -)  5,000 unit SQ TID [facility]


   Last Admin: 01/11/18 05:32 Dose:  5,000 unit


Methylprednisolone Sodium Succinate (Solu-Medrol -)  60 mg IVPB BID [facility]


   Last Admin: 01/11/18 09:54 Dose:  60 mg


Morphine Sulfate (Morphine Injection -)  2 mg IVPUSH Q3H PRN


   PRN Reason: ANXIETY


   Last Admin: 01/11/18 12:00 Dose:  2 mg


Pantoprazole Sodium (Protonix Packets For Oral Suspension -)  40 mg NGT DAILY 

[facility]


   Last Admin: 01/11/18 09:54 Dose:  40 mg





A/P


PEA Cardiac Arrest


Acute on Chronic Hypercapneic and Hypoxic Respiratory Failure


Severe COPD - r/o Acute Exacerbation


Paroxysmal Atrial Flutter


CAD s/p CABG 


s/p bioAVR


Lactic Acidosis resolved


Elevated LFTs likely ischemic injury


Severe LV Systolic Dysfunction


+Troponins likely Demand Ischemia


HTN


Hyperlipidemia


DM





-  continue IV medrol at same dose


-  inhaled bronchodilators standing and PRN


-  taper FiO2 to keep SpO2 >90%


-  minimize sedation to assess mental status, can use morphine PRN for tachypnea

, adjust dose for comfort


-  continue volume assist control, not a candidate for weaning at this time


-  ASA, statin


-  monitor lytes


-  enteral feeds


-  DVT/GI prophylaxis

## 2018-01-12 LAB
ALBUMIN SERPL-MCNC: 2.1 G/DL (ref 3.4–5)
ALBUMIN SERPL-MCNC: 2.7 G/DL (ref 3.4–5)
ALP SERPL-CCNC: 66 U/L (ref 45–117)
ALP SERPL-CCNC: 84 U/L (ref 45–117)
ALT SERPL-CCNC: 116 U/L (ref 12–78)
ALT SERPL-CCNC: 94 U/L (ref 12–78)
ANION GAP SERPL CALC-SCNC: 5 MMOL/L (ref 8–16)
ANION GAP SERPL CALC-SCNC: 9 MMOL/L (ref 8–16)
APTT BLD: 29.8 SECONDS (ref 26.9–34.4)
ARTERIAL BLOOD GAS PCO2: 48.7 MMHG (ref 35–45)
ARTERIAL BLOOD GAS PCO2: 51 MMHG (ref 35–45)
ARTERIAL PATENCY WRIST A: POSITIVE
AST SERPL-CCNC: 28 U/L (ref 15–37)
AST SERPL-CCNC: 64 U/L (ref 15–37)
BASE EXCESS BLDA CALC-SCNC: 2 MEQ/L (ref -2–2)
BASE EXCESS BLDA CALC-SCNC: 2.3 MEQ/L (ref -2–2)
BILIRUB SERPL-MCNC: 0.7 MG/DL (ref 0.2–1)
BILIRUB SERPL-MCNC: 0.9 MG/DL (ref 0.2–1)
BUN SERPL-MCNC: 54 MG/DL (ref 7–18)
BUN SERPL-MCNC: 54 MG/DL (ref 7–18)
CALCIUM SERPL-MCNC: 7.7 MG/DL (ref 8.5–10.1)
CALCIUM SERPL-MCNC: 8.7 MG/DL (ref 8.5–10.1)
CHLORIDE SERPL-SCNC: 114 MMOL/L (ref 98–107)
CHLORIDE SERPL-SCNC: 118 MMOL/L (ref 98–107)
CO2 SERPL-SCNC: 29 MMOL/L (ref 21–32)
CO2 SERPL-SCNC: 30 MMOL/L (ref 21–32)
CREAT SERPL-MCNC: 1 MG/DL (ref 0.7–1.3)
CREAT SERPL-MCNC: 1.2 MG/DL (ref 0.7–1.3)
DEPRECATED RDW RBC AUTO: 18.3 % (ref 11.9–15.9)
DEPRECATED RDW RBC AUTO: 18.7 % (ref 11.9–15.9)
GLUCOSE SERPL-MCNC: 191 MG/DL (ref 74–106)
GLUCOSE SERPL-MCNC: 214 MG/DL (ref 74–106)
HCT VFR BLD CALC: 26.1 % (ref 35.4–49)
HCT VFR BLD CALC: 33.4 % (ref 35.4–49)
HGB BLD-MCNC: 10.2 GM/DL (ref 11.7–16.9)
HGB BLD-MCNC: 8 GM/DL (ref 11.7–16.9)
INR BLD: 1.18 (ref 0.82–1.09)
MAGNESIUM SERPL-MCNC: 2.3 MG/DL (ref 1.8–2.4)
MAGNESIUM SERPL-MCNC: 2.8 MG/DL (ref 1.8–2.4)
MCH RBC QN AUTO: 24.5 PG (ref 25.7–33.7)
MCH RBC QN AUTO: 24.5 PG (ref 25.7–33.7)
MCHC RBC AUTO-ENTMCNC: 30.5 G/DL (ref 32–35.9)
MCHC RBC AUTO-ENTMCNC: 30.7 G/DL (ref 32–35.9)
MCV RBC: 80 FL (ref 80–96)
MCV RBC: 80.4 FL (ref 80–96)
PHOSPHATE SERPL-MCNC: 2.8 MG/DL (ref 2.5–4.9)
PHOSPHATE SERPL-MCNC: 3.2 MG/DL (ref 2.5–4.9)
PLATELET # BLD AUTO: 141 K/MM3 (ref 134–434)
PLATELET # BLD AUTO: 165 K/MM3 (ref 134–434)
PMV BLD: 8.5 FL (ref 7.5–11.1)
PMV BLD: 8.9 FL (ref 7.5–11.1)
PO2 BLDA: 143 MMHG (ref 70–100)
PO2 BLDA: 346 MMHG (ref 70–100)
POTASSIUM SERPLBLD-SCNC: 4.6 MMOL/L (ref 3.5–5.1)
POTASSIUM SERPLBLD-SCNC: 4.8 MMOL/L (ref 3.5–5.1)
PROT SERPL-MCNC: 4.3 G/DL (ref 6.4–8.2)
PROT SERPL-MCNC: 5.4 G/DL (ref 6.4–8.2)
PT PNL PPP: 13.3 SEC (ref 9.98–11.88)
RBC # BLD AUTO: 3.27 M/MM3 (ref 4–5.6)
RBC # BLD AUTO: 4.16 M/MM3 (ref 4–5.6)
SAO2 % BLDA: 100 % (ref 90–98.9)
SAO2 % BLDA: 99.3 % (ref 90–98.9)
SODIUM SERPL-SCNC: 152 MMOL/L (ref 136–145)
SODIUM SERPL-SCNC: 153 MMOL/L (ref 136–145)
WBC # BLD AUTO: 10.5 K/MM3 (ref 4–10)
WBC # BLD AUTO: 7.6 K/MM3 (ref 4–10)

## 2018-01-12 RX ADMIN — LEVETIRACETAM SCH MLS/HR: 100 INJECTION, SOLUTION, CONCENTRATE INTRAVENOUS at 12:38

## 2018-01-12 RX ADMIN — ACETAMINOPHEN PRN MG: 650 SUPPOSITORY RECTAL at 00:51

## 2018-01-12 RX ADMIN — ASPIRIN 81 MG SCH MG: 81 TABLET ORAL at 11:33

## 2018-01-12 RX ADMIN — IPRATROPIUM BROMIDE AND ALBUTEROL SULFATE SCH AMP: .5; 3 SOLUTION RESPIRATORY (INHALATION) at 17:33

## 2018-01-12 RX ADMIN — HEPARIN SODIUM SCH UNIT: 5000 INJECTION, SOLUTION INTRAVENOUS; SUBCUTANEOUS at 21:58

## 2018-01-12 RX ADMIN — IPRATROPIUM BROMIDE AND ALBUTEROL SULFATE SCH AMP: .5; 3 SOLUTION RESPIRATORY (INHALATION) at 07:54

## 2018-01-12 RX ADMIN — IPRATROPIUM BROMIDE AND ALBUTEROL SULFATE SCH AMP: .5; 3 SOLUTION RESPIRATORY (INHALATION) at 21:57

## 2018-01-12 RX ADMIN — MORPHINE SULFATE PRN MG: 10 INJECTION, SOLUTION INTRAMUSCULAR; INTRAVENOUS at 15:38

## 2018-01-12 RX ADMIN — PANTOPRAZOLE SODIUM SCH MG: 40 GRANULE, DELAYED RELEASE ORAL at 11:33

## 2018-01-12 RX ADMIN — INSULIN ASPART SCH UNITS: 100 INJECTION, SOLUTION INTRAVENOUS; SUBCUTANEOUS at 17:52

## 2018-01-12 RX ADMIN — SODIUM CHLORIDE SCH MLS/HR: 4.5 INJECTION, SOLUTION INTRAVENOUS at 13:39

## 2018-01-12 RX ADMIN — MORPHINE SULFATE PRN MG: 10 INJECTION, SOLUTION INTRAMUSCULAR; INTRAVENOUS at 05:56

## 2018-01-12 RX ADMIN — LEVETIRACETAM SCH MLS/HR: 100 INJECTION, SOLUTION, CONCENTRATE INTRAVENOUS at 21:58

## 2018-01-12 RX ADMIN — HEPARIN SODIUM SCH UNIT: 5000 INJECTION, SOLUTION INTRAVENOUS; SUBCUTANEOUS at 05:50

## 2018-01-12 RX ADMIN — SODIUM CHLORIDE SCH MLS/HR: 4.5 INJECTION, SOLUTION INTRAVENOUS at 21:59

## 2018-01-12 RX ADMIN — METHYLPREDNISOLONE SODIUM SUCCINATE SCH MG: 125 INJECTION, POWDER, FOR SOLUTION INTRAMUSCULAR; INTRAVENOUS at 21:57

## 2018-01-12 RX ADMIN — IPRATROPIUM BROMIDE AND ALBUTEROL SULFATE SCH AMP: .5; 3 SOLUTION RESPIRATORY (INHALATION) at 11:03

## 2018-01-12 RX ADMIN — HEPARIN SODIUM SCH UNIT: 5000 INJECTION, SOLUTION INTRAVENOUS; SUBCUTANEOUS at 13:54

## 2018-01-12 RX ADMIN — METHYLPREDNISOLONE SODIUM SUCCINATE SCH MG: 125 INJECTION, POWDER, FOR SOLUTION INTRAMUSCULAR; INTRAVENOUS at 11:34

## 2018-01-12 RX ADMIN — ATORVASTATIN CALCIUM SCH MG: 10 TABLET, FILM COATED ORAL at 21:58

## 2018-01-12 NOTE — PN
Progress Note (short form)





- Note


Progress Note: 





 Neurology











NEUROLOGY NOTE, Covering for Tiffani





Please Dr. Nixon consult for details. This AM, contacted by nurse as patient 

with jerking activity in B/L UE. 


patient seen at bedside and without any abnormal movements


In reviewing the chart, and given his recent history, myoclonic jerks due to 

brain injury may occur


Patient be given one dose of Keppra but at this time, I would not start 

antiepileptic medication unless patient has full generalized tonic-clonic 

seizures


Myoclonic jerks are likely secondary reaction to cardiopulmonary arrest and 

reflexive





 Vital Signs











Temperature  100 F H  01/12/18 09:15


 


Pulse Rate  79   01/12/18 09:59


 


Respiratory Rate  18   01/12/18 09:59


 


Blood Pressure  113/70   01/12/18 09:15


 


O2 Sat by Pulse Oximetry (%)  98   01/12/18 09:59











Exam: Unresponsive to name or tactile stimulation


         no significant tremors or movement disorder noted


         respond slightly to pain


         Flaccid areflexic tetraplegia.





PLan: Severe, B/L cerebral dysfunction with brainstem function present.


       c/w Severe anoxic encephalopathy.


    continue ongoing medical management


1 dose of Keppra given,would not require ongoing antiepileptic medication   


patient may have myoclonic jerking that is reflexive to his cardiopulmonary 

arrest


However, only if he has generalized tonic-clonic seizures that are sustained 

would he require antiepileptic medication


neurologically stable during my visit

## 2018-01-12 NOTE — HOSP
Physical Examination


Vital Signs: 


 Vital Signs











Temperature  98.8 F   01/12/18 15:17


 


Pulse Rate  96 H  01/12/18 19:04


 


Respiratory Rate  18   01/12/18 19:04


 


Blood Pressure  97/50   01/12/18 19:04


 


O2 Sat by Pulse Oximetry (%)  98   01/12/18 09:59











Constitutional: Yes: Well Nourished, Diaphoresis


Neck: Yes: Supple, Other (mechanically vented)


Cardiovascular: Yes: Tachycardia, S1, S2


Respiratory: Yes: Mechanically Ventilated, Rhonchi


Gastrointestinal: Yes: Soft.  No: Distention


Edema: Yes


Labs: 


 CBC, BMP





 01/12/18 06:55 





 01/12/18 06:55 











Hospitalist Encounter


Assessment: 





72M with PMH of MI (2013), s/p Aortic Valve Replacement (2013), CABG, HTN, HLD, 

COPD (trach dependent), admitted s/p Cardiac Arrest, Acute Respiratory Failure, 

Hypercapnia





Code 99 called, team responded immediately.


Pt had faint pulse.  VS: HR 91, BP 88/53, O2 sat 100% - vented


BVM on trach was performed to assist breathing.





**Pt's family (son and daughter-in-law) present at bedside, indicated pt is 

DNR.  Son called pt's wife, who declined to come to the hospital but agrees 

with DNR.  Pt should continue receiving mechanical ventilation, but no 

resuscitative measures are to be taken.  Son requested no pressors also.**





 ml bolus given


CXR from prior Rapid reviewed -> mild congestive cephalization read by team


Stat labs ordered: 


   - POC -> 200


   - abg -> relatively stable 


   - CBC -> reveals dilutional drop in WBC, H/H and platelets 


   - CMP -> persistent hypernatremia


   - mg, phos -> wnl


   - coags -> stable


   


VS: HR 84, BP 77/42, O2 sat 100%


Day Team to f/u.








Visit type





- Emergency Visit


Emergency Visit: Yes


ED Registration Date: 01/05/18


Care time: The patient presented to the Emergency Department on the above date 

and was hospitalized for further evaluation of their emergent condition.





- New Patient


This patient is new to me today: No





- Critical Care


Critical Care patient: No

## 2018-01-12 NOTE — PN
Progress Note, Physician


History of Present Illness: 





Pt seen and examined at bedside. He appears to be having seizure like activity 

of right arm and right facial twitching. 





- Current Medication List


Current Medications: 


Active Medications





Acetaminophen (Tylenol Suppository -)  650 mg [place] Q6H PRN


   PRN Reason: FEVER


   Last Admin: 01/12/18 00:51 Dose:  650 mg


Albuterol/Ipratropium (Duoneb -)  1 amp [facility]B [facility]


   Last Admin: 01/12/18 11:03 Dose:  1 amp


Aspirin (Asa -)  81 mg NGT DAILY [facility]


   Last Admin: 01/12/18 11:33 Dose:  81 mg


Atorvastatin Calcium (Lipitor -)  10 mg NGT HS [facility]


   Last Admin: 01/11/18 21:28 Dose:  10 mg


Heparin Sodium (Porcine) (Heparin -)  5,000 unit SQ TID [facility]


   Last Admin: 01/12/18 05:50 Dose:  5,000 unit


Levetiracetam 750 mg/ Dextrose  107.5 mls @ 430 mls/hr IVPB BID [facility]


Methylprednisolone Sodium Succinate (Solu-Medrol -)  60 mg IVPB BID [facility]


   Last Admin: 01/12/18 11:34 Dose:  60 mg


Morphine Sulfate (Morphine Injection -)  2 mg IVPUSH Q3H PRN


   PRN Reason: ANXIETY


   Last Admin: 01/12/18 05:56 Dose:  2 mg


Pantoprazole Sodium (Protonix Packets For Oral Suspension -)  40 mg NGT DAILY 

[facility]


   Last Admin: 01/12/18 11:33 Dose:  40 mg











- Objective


Vital Signs: 


 Vital Signs











Temperature  100 F H  01/12/18 09:15


 


Pulse Rate  79   01/12/18 09:59


 


Respiratory Rate  18   01/12/18 09:59


 


Blood Pressure  113/70   01/12/18 09:15


 


O2 Sat by Pulse Oximetry (%)  98   01/12/18 09:59











Constitutional: Yes: Mild Distress


Neck: Yes: Other (trache)


Cardiovascular: Yes: S1, S2


Respiratory: Yes: Mechanically Ventilated


Gastrointestinal: Yes: Soft


Genitourinary: Yes: Lion Present


Musculoskeletal: Yes: Muscle Weakness


Edema: Yes


Neurological: Yes: Lethargy, Seizure


Labs: 


 CBC, BMP





 01/12/18 06:55 





 01/12/18 06:55 





 INR, PTT











INR  1.30  (0.82-1.09)  H D 01/05/18  20:09    














Problem List





- Problems


(1) Azotemia


Code(s): R79.89 - OTHER SPECIFIED ABNORMAL FINDINGS OF BLOOD CHEMISTRY   





(2) Cardiac arrest


Code(s): I46.9 - CARDIAC ARREST, CAUSE UNSPECIFIED   





Assessment/Plan


 Current Medications











Generic Name Dose Route Start Last Admin





  Trade Name Freq  PRN Reason Stop Dose Admin


 


Acetaminophen  650 mg  01/11/18 23:57  01/12/18 00:51





  Tylenol Suppository -  TX   650 mg





  Q6H PRN   Administration





  FEVER   


 


Albuterol/Ipratropium  1 amp  01/10/18 20:00  01/12/18 11:03





  Duoneb -  NEB   1 amp





  RQID DOROTHY   Administration


 


Aspirin  81 mg  01/11/18 10:00  01/12/18 11:33





  Asa -  NGT   81 mg





  DAILY DOROTHY   Administration


 


Atorvastatin Calcium  10 mg  01/10/18 22:00  01/11/18 21:28





  Lipitor -  NGT   10 mg





  HS DOROTHY   Administration


 


Heparin Sodium (Porcine)  5,000 unit  01/10/18 22:00  01/12/18 05:50





  Heparin -  SQ   5,000 unit





  TID DOROTHY   Administration


 


Levetiracetam 750 mg/ Dextrose  107.5 mls @ 430 mls/hr  01/12/18 12:30  





  IVPB   





  BID DOROTHY   


 


Methylprednisolone Sodium Succinate  60 mg  01/10/18 14:45  01/12/18 11:34





  Solu-Medrol -  IVPB   60 mg





  BID DOROTHY   Administration


 


Morphine Sulfate  2 mg  01/11/18 11:52  01/12/18 05:56





  Morphine Injection -  IVPUSH   2 mg





  Q3H PRN   Administration





  ANXIETY   


 


Pantoprazole Sodium  40 mg  01/11/18 10:00  01/12/18 11:33





  Protonix Packets For Oral Suspension -  NGT   40 mg





  DAILY DOROTHY   Administration











Impression


1. s/p cardiac arrest


2. chronic respiratory failure


3. history of cardiac arrest in the past as well


4. anemia


5. hyperlipidemia


6. aoritc valve disease


7. anxiety


8. urinary retention


9. insomnia


10. DM


11. a-flutter


12. transaminitis


13. azotemia


14. hypernatremia





Plan


- free water was not increase, spoke to nurse to increase flushes


- repeat labs in am


- called neuro bout seizures and they will adjust meds


- case discussed with pts son


- discussed with medical attending


- will start hypotonic fluids


- monitor urine output and watch for signs of DI


- prognosis is poor








Dr Thakkar

## 2018-01-12 NOTE — PN
Progress Note, Physician


Chief Complaint: 





NOTIFIED BY NURSE PATIENT HAVING FACIAL TWITCHING


SON BEDSIDE


NEUROLGY NOTIFIED





- Current Medication List


Current Medications: 


Active Medications





Acetaminophen (Tylenol Suppository -)  650 mg [place] Q6H PRN


   PRN Reason: FEVER


   Last Admin: 01/12/18 00:51 Dose:  650 mg


Albuterol/Ipratropium (Duoneb -)  1 amp NEB RQID [facility]


   Last Admin: 01/12/18 11:03 Dose:  1 amp


Aspirin (Asa -)  81 mg NGT DAILY [facility] includes [facility]


   Last Admin: 01/12/18 11:33 Dose:  81 mg


Atorvastatin Calcium (Lipitor -)  10 mg NGT HS [facility] includes the [facility]


   Last Admin: 01/11/18 21:28 Dose:  10 mg


Heparin Sodium (Porcine) (Heparin -)  5,000 unit SQ TID Count includes the [facility]


   Last Admin: 01/12/18 05:50 Dose:  5,000 unit


Levetiracetam 750 mg/ Dextrose  107.5 mls @ 430 mls/hr IVPB BID Count includes the [facility]


   Last Admin: 01/12/18 12:38 Dose:  430 mls/hr


Sodium Chloride (1/2 Normal Saline)  1,000 mls @ 42 mls/hr IV ASDIR Count includes the [facility]


Methylprednisolone Sodium Succinate (Solu-Medrol -)  60 mg IVPB BID Count includes the [facility]


   Last Admin: 01/12/18 11:34 Dose:  60 mg


Morphine Sulfate (Morphine Injection -)  2 mg IVPUSH Q3H PRN


   PRN Reason: ANXIETY


   Last Admin: 01/12/18 05:56 Dose:  2 mg


Pantoprazole Sodium (Protonix Packets For Oral Suspension -)  40 mg NGT DAILY 

[facility] includes the [facility]


   Last Admin: 01/12/18 11:33 Dose:  40 mg











- Objective


Vital Signs: 


 Vital Signs











Temperature  100 F H  01/12/18 09:15


 


Pulse Rate  79   01/12/18 09:59


 


Respiratory Rate  18   01/12/18 09:59


 


Blood Pressure  113/70   01/12/18 09:15


 


O2 Sat by Pulse Oximetry (%)  98   01/12/18 09:59











Constitutional: Yes: Severe Distress


Eyes: Yes: Other


HENT: Yes: Other


Neck: Yes: Other


Cardiovascular: Yes: Regular Rate and Rhythm


Respiratory: Yes: Mechanically Ventilated, Poor Air Entry


Gastrointestinal: Yes: WNL


Genitourinary: Yes: Lion Present


Musculoskeletal: Yes: Muscle Weakness


Extremities: Yes: Other


Edema: Yes


Peripheral Pulses WNL: Yes


Integumentary: Yes: Rash


Wound/Incision: Yes: Dressing Dry and Intact


Neurological: Yes: Pre-Existing Deficit


...Motor Strength: LLE, RLE


Psychiatric: Yes: Other


Labs: 


 CBC, BMP





 01/12/18 06:55 





 01/12/18 06:55 





 INR, PTT











INR  1.30  (0.82-1.09)  H D 01/05/18  20:09    














Problem List





- Problems


(1) Anoxia


Code(s): R09.02 - HYPOXEMIA   





(2) Cardiac arrest


Code(s): I46.9 - CARDIAC ARREST, CAUSE UNSPECIFIED   





(3) DVT prophylaxis


Code(s): [id number] -    





(4) Acute and chronic respiratory failure with hypercapnia


Code(s): J96.22 - ACUTE AND CHRONIC RESPIRATORY FAILURE WITH HYPERCAPNIA   





(5) Acute hyperkalemia


Code(s): E87.5 - HYPERKALEMIA   





(6) Acute on chronic systolic (congestive) heart failure


Code(s): I50.23 - ACUTE ON CHRONIC SYSTOLIC (CONGESTIVE) HEART FAILURE   





(7) Acute renal failure (ARF)


Code(s): N17.9 - ACUTE KIDNEY FAILURE, UNSPECIFIED   


Qualifiers: 


   Acute renal failure type: unspecified   Qualified Code(s): N17.9 - Acute 

kidney failure, unspecified   





(8) Anemia


Code(s): D64.9 - ANEMIA, UNSPECIFIED   


Qualifiers: 


   Anemia type: other cause   Other causes of anemia: chronic disease, other   

Qualified Code(s): D63.8 - Anemia in other chronic diseases classified 

elsewhere   





Assessment/Plan





KEPPRA IV STARTED FOR SEIZURES


NEUROLGY EVAL


PULM SUPPORT


OVERALL PROGNOSIS POOR


DISCUSSED WITH SON


RECOMMENDING TERMINAL WEANING WILL


AWAIT FAMILY DECISION

## 2018-01-12 NOTE — PN
Progress Note (short form)





- Note


Progress Note: 





PULMONARY





CHART REVIEWED





REMAINS ON VENT 


SETTINGS ADEQUATE/ABG REVIEWED





NO SIGNIFICANT CHANGE IN EXAM








PEA Cardiac Arrest


Acute on Chronic Hypercapneic and Hypoxic Respiratory Failure


Severe COPD - r/o Acute Exacerbation


Paroxysmal Atrial Flutter


CAD s/p CABG 


s/p bioAVR


Lactic Acidosis resolved


Elevated LFTs likely ischemic injury


Severe LV Systolic Dysfunction


+Troponins likely Demand Ischemia


HTN


Hyperlipidemia


DM





-  continue IV medrol at same dose


-  inhaled bronchodilators standing and PRN


-  taper FiO2 to keep SpO2 >90%


-  minimize sedation to assess mental status, can use morphine PRN for tachypnea

, adjust dose for comfort


-  continue volume assist control, not a candidate for weaning at this time


-  ASA, statin


-  monitor lytes


-  enteral feeds


-  DVT/GI prophylaxis





YAEL TEE MD

## 2018-01-12 NOTE — RAPID
Physical Examination


Vital Signs: 


 Vital Signs











Temperature  98.8 F   01/12/18 15:17


 


Pulse Rate  103 H  01/12/18 15:17


 


Respiratory Rate  26 H  01/12/18 15:17


 


Blood Pressure  137/58   01/12/18 15:17


 


O2 Sat by Pulse Oximetry (%)  98   01/12/18 09:59











Constitutional: Yes: Well Nourished, Diaphoresis


HENT: Yes: Atraumatic, Normocephalic


Neck: Yes: Supple, Other (trached)


Cardiovascular: Yes: Tachycardia, S1, S2


Respiratory: Yes: Mechanically Ventilated, Rhonchi


Gastrointestinal: Yes: Soft.  No: Distention


Edema: Yes (throughout)


Neurological: Yes: Seizure


Labs: 


 CBC, BMP





 01/12/18 06:55 





 01/12/18 06:55 











Rapid Response





- Rapid Response


Assessment: 





72M with PMH of MI (2013), s/p Aortic Valve Replacement (2013), CABG, HTN, HLD, 

COPD (trach dependent), admitted s/p Cardiac Arrest, Acute Respiratory Failure, 

Hypercapnia.





Rapid called because pt was having a seizure.  Team responded immediately.


VS: 's, /80, O2 sat 99% vented





Stat EKG -> sinus tachycardia 


Stat trops -> slightly elevated, though trending down


Stat lactic acid -> wnl


Stat CXR -> pulmonary vascular congestion and small pleural effusions, overall 

similar to prior study.  


Ativan 2mg IVpush given -> with good effect





Shaking/seizing likely 2/2 anoxic brain injury.  Will f/u stat orders.

## 2018-01-13 LAB
ALBUMIN SERPL-MCNC: 2.5 G/DL (ref 3.4–5)
ALP SERPL-CCNC: 74 U/L (ref 45–117)
ALT SERPL-CCNC: 96 U/L (ref 12–78)
ANION GAP SERPL CALC-SCNC: 3 MMOL/L (ref 8–16)
AST SERPL-CCNC: 21 U/L (ref 15–37)
BILIRUB SERPL-MCNC: 0.9 MG/DL (ref 0.2–1)
BUN SERPL-MCNC: 49 MG/DL (ref 7–18)
CALCIUM SERPL-MCNC: 8 MG/DL (ref 8.5–10.1)
CHLORIDE SERPL-SCNC: 116 MMOL/L (ref 98–107)
CO2 SERPL-SCNC: 32 MMOL/L (ref 21–32)
CREAT SERPL-MCNC: 0.9 MG/DL (ref 0.7–1.3)
GLUCOSE SERPL-MCNC: 189 MG/DL (ref 74–106)
POTASSIUM SERPLBLD-SCNC: 4.4 MMOL/L (ref 3.5–5.1)
PROT SERPL-MCNC: 4.9 G/DL (ref 6.4–8.2)
SODIUM SERPL-SCNC: 151 MMOL/L (ref 136–145)

## 2018-01-13 RX ADMIN — DEXTROSE MONOHYDRATE SCH MLS/HR: 50 INJECTION, SOLUTION INTRAVENOUS at 14:33

## 2018-01-13 RX ADMIN — LORAZEPAM PRN MG: 2 INJECTION INTRAMUSCULAR; INTRAVENOUS at 13:10

## 2018-01-13 RX ADMIN — IPRATROPIUM BROMIDE AND ALBUTEROL SULFATE SCH AMP: .5; 3 SOLUTION RESPIRATORY (INHALATION) at 15:30

## 2018-01-13 RX ADMIN — IPRATROPIUM BROMIDE AND ALBUTEROL SULFATE SCH AMP: .5; 3 SOLUTION RESPIRATORY (INHALATION) at 21:05

## 2018-01-13 RX ADMIN — METHYLPREDNISOLONE SODIUM SUCCINATE SCH MG: 125 INJECTION, POWDER, FOR SOLUTION INTRAMUSCULAR; INTRAVENOUS at 21:37

## 2018-01-13 RX ADMIN — LEVETIRACETAM SCH MLS/HR: 100 INJECTION, SOLUTION, CONCENTRATE INTRAVENOUS at 10:02

## 2018-01-13 RX ADMIN — HEPARIN SODIUM SCH UNIT: 5000 INJECTION, SOLUTION INTRAVENOUS; SUBCUTANEOUS at 06:33

## 2018-01-13 RX ADMIN — ATORVASTATIN CALCIUM SCH MG: 10 TABLET, FILM COATED ORAL at 21:37

## 2018-01-13 RX ADMIN — INSULIN ASPART SCH: 100 INJECTION, SOLUTION INTRAVENOUS; SUBCUTANEOUS at 00:45

## 2018-01-13 RX ADMIN — ACETAMINOPHEN PRN MG: 650 SUPPOSITORY RECTAL at 21:37

## 2018-01-13 RX ADMIN — HEPARIN SODIUM SCH UNIT: 5000 INJECTION, SOLUTION INTRAVENOUS; SUBCUTANEOUS at 21:37

## 2018-01-13 RX ADMIN — INSULIN ASPART SCH UNITS: 100 INJECTION, SOLUTION INTRAVENOUS; SUBCUTANEOUS at 17:48

## 2018-01-13 RX ADMIN — INSULIN ASPART SCH: 100 INJECTION, SOLUTION INTRAVENOUS; SUBCUTANEOUS at 12:24

## 2018-01-13 RX ADMIN — LEVETIRACETAM SCH MLS/HR: 100 INJECTION, SOLUTION, CONCENTRATE INTRAVENOUS at 21:36

## 2018-01-13 RX ADMIN — INSULIN ASPART SCH: 100 INJECTION, SOLUTION INTRAVENOUS; SUBCUTANEOUS at 06:33

## 2018-01-13 RX ADMIN — METHYLPREDNISOLONE SODIUM SUCCINATE SCH MG: 125 INJECTION, POWDER, FOR SOLUTION INTRAMUSCULAR; INTRAVENOUS at 09:33

## 2018-01-13 RX ADMIN — INSULIN ASPART SCH UNITS: 100 INJECTION, SOLUTION INTRAVENOUS; SUBCUTANEOUS at 21:38

## 2018-01-13 RX ADMIN — IPRATROPIUM BROMIDE AND ALBUTEROL SULFATE SCH AMP: .5; 3 SOLUTION RESPIRATORY (INHALATION) at 08:40

## 2018-01-13 RX ADMIN — PANTOPRAZOLE SODIUM SCH MG: 40 GRANULE, DELAYED RELEASE ORAL at 09:33

## 2018-01-13 RX ADMIN — HEPARIN SODIUM SCH UNIT: 5000 INJECTION, SOLUTION INTRAVENOUS; SUBCUTANEOUS at 14:21

## 2018-01-13 RX ADMIN — ASPIRIN 81 MG SCH MG: 81 TABLET ORAL at 09:33

## 2018-01-13 RX ADMIN — IPRATROPIUM BROMIDE AND ALBUTEROL SULFATE SCH AMP: .5; 3 SOLUTION RESPIRATORY (INHALATION) at 11:46

## 2018-01-13 NOTE — EKG
Test Reason : 

Blood Pressure : ***/*** mmHG

Vent. Rate : 107 BPM     Atrial Rate : 107 BPM

   P-R Int : 000 ms          QRS Dur : 098 ms

    QT Int : 300 ms       P-R-T Axes : 091 -57 100 degrees

   QTc Int : 400 ms

 

SINUS TACHYCARDIA WITH OCCASIONAL PREMATURE VENTRICULAR COMPLEXES

LEFT AXIS DEVIATION

LOW VOLTAGE QRS

INFERIOR INFARCT (CITED ON OR BEFORE 03-NOV-2016)

CANNOT RULE OUT ANTERIOR INFARCT , AGE UNDETERMINED

NONSPECIFIC T WAVE ABNORMALITY

ABNORMAL ECG

WHEN COMPARED WITH ECG OF 05-JAN-2018 19:47,

SIGNIFICANT CHANGES HAVE OCCURRED

Confirmed by BRADFORD KAY MD ([id number]) on 1/13/2018 4:53:20 PM

 

Referred By:             Confirmed By:BRADFORD KAY MD

## 2018-01-13 NOTE — PN
Progress Note (short form)





- Note


Progress Note: 





PULMONARY





Events yesterday noted. Increased seizure activity. Son at bedside. 





 Last Vital Signs











Temp Pulse Resp BP Pulse Ox


 


 99.5 F   85   17   149/77   100 


 


 01/13/18 13:12  01/13/18 13:12  01/13/18 13:12  01/13/18 13:12  01/13/18 09:00











Gen:  vented, less tachypneic


Heart:  RRR


Lung:  distant breath sounds, no wheezes


Abd: soft, nontender


Ext: no edema





 CBC, BMP





 01/12/18 20:20 





 01/13/18 07:00 





Active Medications





Acetaminophen (Tylenol Suppository -)  650 mg LA Q6H PRN


   PRN Reason: FEVER


   Last Admin: 01/12/18 00:51 Dose:  650 mg


Albuterol/Ipratropium (Duoneb -)  1 amp NEB RQID [facility]


   Last Admin: 01/13/18 11:46 Dose:  1 amp


Alteplase, Recombinant (Cathflo Activase -)  2 mg CVP ONCE ONE


   Stop: 01/12/18 17:31


Aspirin (Asa -)  81 mg NGT DAILY [facility]


   Last Admin: 01/13/18 09:33 Dose:  81 mg


Atorvastatin Calcium (Lipitor -)  10 mg NGT HS [facility]


   Last Admin: 01/12/18 21:58 Dose:  10 mg


Heparin Sodium (Porcine) (Heparin -)  5,000 unit SQ TID [facility]


   Last Admin: 01/13/18 14:21 Dose:  5,000 unit


Levetiracetam 1,500 mg/ Sodium (Chloride)  115 mls @ 460 mls/hr IVPB BID [facility]


   Last Admin: 01/13/18 10:02 Dose:  460 mls/hr


Dextrose (D5w -)  1,000 mls @ 42 mls/hr IV Q23H [facility]


   Last Admin: 01/13/18 14:33 Dose:  42 mls/hr


Insulin Aspart (Novolog Vial Sliding Scale -)  1 vial SQ ACHS DOROTHY


   PRN Reason: Protocol


   Last Admin: 01/13/18 12:24 Dose:  Not Given


Lorazepam (Ativan Injection -)  2 mg IVPUSH Q4H PRN


   PRN Reason: ANXIETY


   Last Admin: 01/13/18 13:10 Dose:  2 mg


Methylprednisolone Sodium Succinate (Solu-Medrol -)  60 mg IVPB BID [facility]


   Last Admin: 01/13/18 09:33 Dose:  60 mg


Morphine Sulfate (Morphine Injection -)  2 mg IVPUSH Q3H PRN


   PRN Reason: ANXIETY


   Last Admin: 01/12/18 15:38 Dose:  2 mg


Pantoprazole Sodium (Protonix Packets For Oral Suspension -)  40 mg NGT DAILY 

[facility]


   Last Admin: 01/13/18 09:33 Dose:  40 mg








A/P


PEA Cardiac Arrest


Acute on Chronic Hypercapneic and Hypoxic Respiratory Failure


Severe COPD - r/o Acute Exacerbation


Paroxysmal Atrial Flutter


CAD s/p CABG 


s/p bioAVR


Lactic Acidosis resolved


Elevated LFTs likely ischemic injury


Severe LV Systolic Dysfunction


+Troponins likely Demand Ischemia


HTN


Hyperlipidemia


DM


Seizures





-  antiepileptics per neuro


-  continue IV medrol at same dose


-  inhaled bronchodilators standing and PRN


-  taper FiO2 to keep SpO2 >90%


-  minimize sedation to assess mental status, can use morphine PRN for tachypnea

, adjust dose for comfort


-  continue volume assist control, not a candidate for weaning at this time


-  ASA, statin


-  monitor lytes


-  enteral feeds


-  DVT/GI prophylaxis


-  poor overall prognosis

## 2018-01-13 NOTE — PN
Progress Note, Physician


Chief Complaint: 





FAMILY BEDSIDE


NO CHANGE


STILL UNRESPONSIVE





- Current Medication List


Current Medications: 


Active Medications





Acetaminophen (Tylenol Suppository -)  650 mg LA Q6H PRN


   PRN Reason: FEVER


   Last Admin: 01/12/18 00:51 Dose:  650 mg


Albuterol/Ipratropium (Duoneb -)  1 amp NEB RQID [facility]


   Last Admin: 01/13/18 11:46 Dose:  1 amp


Alteplase, Recombinant (Cathflo Activase -)  2 mg CVP ONCE ONE


   Stop: 01/12/18 17:31


Aspirin (Asa -)  81 mg NGT DAILY [facility]


   Last Admin: 01/13/18 09:33 Dose:  81 mg


Atorvastatin Calcium (Lipitor -)  10 mg NGT HS [facility]


   Last Admin: 01/12/18 21:58 Dose:  10 mg


Heparin Sodium (Porcine) (Heparin -)  5,000 unit SQ TID [facility]


   Last Admin: 01/13/18 06:33 Dose:  5,000 unit


Levetiracetam 1,500 mg/ Sodium (Chloride)  115 mls @ 460 mls/hr IVPB BID [facility]


   Last Admin: 01/13/18 10:02 Dose:  460 mls/hr


Dextrose (D5w -)  1,000 mls @ 42 mls/hr IV .[id number] [facility]


Insulin Aspart (Novolog Vial Sliding Scale -)  1 vial SQ ACHS [facility]


   PRN Reason: Protocol


   Last Admin: 01/13/18 12:24 Dose:  Not Given


Lorazepam (Ativan Injection -)  2 mg IVPUSH Q4H PRN


   PRN Reason: ANXIETY


Methylprednisolone Sodium Succinate (Solu-Medrol -)  60 mg IVPB BID [facility]


   Last Admin: 01/13/18 09:33 Dose:  60 mg


Morphine Sulfate (Morphine Injection -)  2 mg IVPUSH Q3H PRN


   PRN Reason: ANXIETY


   Last Admin: 01/12/18 15:38 Dose:  2 mg


Pantoprazole Sodium (Protonix Packets For Oral Suspension -)  40 mg NGT DAILY 

[facility]


   Last Admin: 01/13/18 09:33 Dose:  40 mg











- Objective


Vital Signs: 


 Vital Signs











Temperature  99.6 F   01/13/18 06:00


 


Pulse Rate  80   01/13/18 10:00


 


Respiratory Rate  18   01/13/18 10:00


 


Blood Pressure  156/80   01/13/18 10:00


 


O2 Sat by Pulse Oximetry (%)  100   01/13/18 09:00











Constitutional: Yes: Severe Distress


Eyes: Yes: Other


Cardiovascular: Yes: Other


Respiratory: Yes: Mechanically Ventilated


Genitourinary: Yes: Lion Present


Musculoskeletal: Yes: Muscle Weakness


Extremities: Yes: Other


Peripheral Pulses WNL: Yes


Integumentary: Yes: Other


Wound/Incision: Yes: Other


Neurological: Yes: Unresponsive


Labs: 


 CBC, BMP





 01/12/18 20:20 





 01/13/18 07:00 





 INR, PTT











INR  1.18  (0.82-1.09)  H  01/12/18  20:20    














Problem List





- Problems


(1) Anoxia


Code(s): R09.02 - HYPOXEMIA   





(2) Cardiac arrest


Code(s): I46.9 - CARDIAC ARREST, CAUSE UNSPECIFIED   





(3) DVT prophylaxis


Code(s): [id number] -    





(4) Acute and chronic respiratory failure with hypercapnia


Code(s): J96.22 - ACUTE AND CHRONIC RESPIRATORY FAILURE WITH HYPERCAPNIA   





(5) Acute hyperkalemia


Code(s): E87.5 - HYPERKALEMIA   





(6) Acute on chronic systolic (congestive) heart failure


Code(s): I50.23 - ACUTE ON CHRONIC SYSTOLIC (CONGESTIVE) HEART FAILURE   





(7) Acute renal failure (ARF)


Code(s): N17.9 - ACUTE KIDNEY FAILURE, UNSPECIFIED   


Qualifiers: 


   Acute renal failure type: unspecified   Qualified Code(s): N17.9 - Acute 

kidney failure, unspecified   





(8) Anemia


Code(s): D64.9 - ANEMIA, UNSPECIFIED   


Qualifiers: 


   Anemia type: other cause   Other causes of anemia: chronic disease, other   

Qualified Code(s): D63.8 - Anemia in other chronic diseases classified 

elsewhere   





Assessment/Plan





PALLIATIVE CARE


WITHDRAWEL OF CARE


COMFORT MEASURES ONLY


STOP LAB DRAWS

## 2018-01-13 NOTE — PN
Progress Note (short form)





- Note


Progress Note: 





RENAL


pt seen and examined


family by bedside





Pt appears comfortable


 Last Vital Signs











Temp Pulse Resp BP Pulse Ox


 


 99.6 F   80   18   156/80   100 


 


 01/13/18 06:00  01/13/18 10:00  01/13/18 10:00  01/13/18 10:00  01/13/18 09:00





heent trach in place


lungs clear anteriorly, bilaterally


cvs s1s2 rr


abd soft


ext +edema


neuro not responsive





 CBC, BMP





 01/12/18 20:20 





 01/13/18 07:00 





 Current Medications











Generic Name Dose Route Start Last Admin





  Trade Name Freq  PRN Reason Stop Dose Admin


 


Acetaminophen  650 mg  01/11/18 23:57  01/12/18 00:51





  Tylenol Suppository -  [place]   650 mg





  Q6H PRN   Administration





  FEVER   


 


Albuterol/Ipratropium  1 amp  01/10/18 20:00  01/13/18 11:46





  Duoneb -  NEB   1 amp





  RQID DOROTHY   Administration


 


Alteplase, Recombinant  2 mg  01/12/18 17:30  





  Cathflo Activase -  CVP  01/12/18 17:31  





  ONCE ONE   


 


Aspirin  81 mg  01/11/18 10:00  01/13/18 09:33





  Asa -  NGT   81 mg





  DAILY DOROHTY   Administration


 


Atorvastatin Calcium  10 mg  01/10/18 22:00  01/12/18 21:58





  Lipitor -  NGT   10 mg





  HS DOROTHY   Administration


 


Heparin Sodium (Porcine)  5,000 unit  01/10/18 22:00  01/13/18 06:33





  Heparin -  SQ   5,000 unit





  TID DOROTHY   Administration


 


Sodium Chloride  1,000 mls @ 42 mls/hr  01/12/18 12:45  01/12/18 21:59





  1/2 Normal Saline  IV   42 mls/hr





  ASDIR DOROTHY   Administration


 


Levetiracetam 1,500 mg/ Sodium  115 mls @ 460 mls/hr  01/13/18 10:00  01/13/18 

10:02





  Chloride  IVPB   460 mls/hr





  BID DOROTHY   Administration


 


Insulin Aspart  1 vial  01/12/18 16:30  01/13/18 06:33





  Novolog Vial Sliding Scale -  SQ   Not Given





  ACHS DOROTHY   





  Protocol   


 


Lorazepam  2 mg  01/13/18 02:01  





  Ativan Injection -  IVPUSH   





  Q4H PRN   





  ANXIETY   


 


Methylprednisolone Sodium Succinate  60 mg  01/10/18 14:45  01/13/18 09:33





  Solu-Medrol -  IVPB   60 mg





  BID DOROTHY   Administration


 


Morphine Sulfate  2 mg  01/11/18 11:52  01/12/18 15:38





  Morphine Injection -  IVPUSH   2 mg





  Q3H PRN   Administration





  ANXIETY   


 


Pantoprazole Sodium  40 mg  01/11/18 10:00  01/13/18 09:33





  Protonix Packets For Oral Suspension -  NGT   40 mg





  DAILY DOROTHY   Administration








Impression


1. s/p cardiac arrest


2. chronic respiratory failure


3. history of cardiac arrest in the past as well


4. anemia


5. hyperlipidemia


6. aortic valve disease


7. anxiety


8. urinary retention


9. insomnia


10. DM


11. a-flutter


12. transaminitis


13. azotemia


14. hypernatremia





Plan


- continue free water


- repeat labs in am


- change iv to d5w


- monitor urine output and watch for signs of DI


- prognosis is poor





MV

## 2018-01-13 NOTE — RAPID
Physical Examination


Vital Signs: 


               Vital Signs











Temperature  98.8 F   01/12/18 15:17


 


Pulse Rate  82   01/12/18 19:45


 


Respiratory Rate  18   01/13/18 00:52


 


Blood Pressure  74/47   01/12/18 19:45


 


O2 Sat by Pulse Oximetry (%)  99   01/12/18 19:40











Constitutional: Yes: Well Nourished, Diaphoresis


HENT: Yes: Atraumatic, Normocephalic


Neck: Yes: Supple, Other (trached)


Cardiovascular: Yes: Tachycardia, S1, S2


Respiratory: Yes: Mechanically Ventilated


Gastrointestinal: Yes: Soft.  No: Distention


Edema: Yes


Neurological: Yes: Seizure


Labs: 


 CBC, BMP





 01/12/18 20:20 





 01/12/18 20:20 











Rapid Response





- Rapid Response


Assessment: 





72M with PMH of MI (2013), s/p Aortic Valve Replacement (2013), CABG, HTN, HLD, 

COPD (trach dependent), admitted s/p Cardiac Arrest, Acute Respiratory Failure, 

Hypercapnia.





Rapid called because pt was having a seizure.  Team responded immediately.


VS: , /52, O2 Sat 100%





POC blood glucose -> 169


Ativan 2mg IVpush given with immediate effect


Neurologist (Dr. Rubi) contacted and recs appreciated: Keppra 1g now and 

start 1500mg BID for tomorrow, order EEG, Depakote can be added if needed





VS: HR 96, /65, O2 Sat 100%





Ativan prn ordered

## 2018-01-14 RX ADMIN — LEVETIRACETAM SCH MLS/HR: 100 INJECTION, SOLUTION, CONCENTRATE INTRAVENOUS at 21:04

## 2018-01-14 RX ADMIN — DEXTROSE MONOHYDRATE SCH MLS/HR: 50 INJECTION, SOLUTION INTRAVENOUS at 13:55

## 2018-01-14 RX ADMIN — HEPARIN SODIUM SCH UNIT: 5000 INJECTION, SOLUTION INTRAVENOUS; SUBCUTANEOUS at 06:16

## 2018-01-14 RX ADMIN — IPRATROPIUM BROMIDE AND ALBUTEROL SULFATE SCH: .5; 3 SOLUTION RESPIRATORY (INHALATION) at 12:38

## 2018-01-14 RX ADMIN — IPRATROPIUM BROMIDE AND ALBUTEROL SULFATE SCH AMP: .5; 3 SOLUTION RESPIRATORY (INHALATION) at 08:40

## 2018-01-14 RX ADMIN — ASPIRIN 81 MG SCH MG: 81 TABLET ORAL at 10:32

## 2018-01-14 RX ADMIN — PANTOPRAZOLE SODIUM SCH MG: 40 GRANULE, DELAYED RELEASE ORAL at 10:32

## 2018-01-14 RX ADMIN — LEVETIRACETAM SCH MLS/HR: 100 INJECTION, SOLUTION, CONCENTRATE INTRAVENOUS at 10:33

## 2018-01-14 RX ADMIN — METHYLPREDNISOLONE SODIUM SUCCINATE SCH MG: 125 INJECTION, POWDER, FOR SOLUTION INTRAMUSCULAR; INTRAVENOUS at 10:32

## 2018-01-14 RX ADMIN — INSULIN ASPART SCH UNITS: 100 INJECTION, SOLUTION INTRAVENOUS; SUBCUTANEOUS at 06:17

## 2018-01-14 RX ADMIN — INSULIN ASPART SCH UNITS: 100 INJECTION, SOLUTION INTRAVENOUS; SUBCUTANEOUS at 11:21

## 2018-01-14 NOTE — PN
Progress Note (short form)





- Note


Progress Note: 





RENAL


pt seen and examined


family by bedside


Pt appears comfortable


 


 Last Vital Signs











Temp Pulse Resp BP Pulse Ox


 


 99.1 F   96 H  18   138/70   97 


 


 01/14/18 09:00  01/14/18 09:00  01/14/18 09:00  01/14/18 09:00  01/14/18 08:40














heent trach in place


lungs clear anteriorly, bilaterally


cvs s1s2 rr


abd soft


ext +edema


neuro not responsive











 CBC, BMP





 01/12/18 20:20 





 01/13/18 07:00 





 Current Medications











Generic Name Dose Route Start Last Admin





  Trade Name Freq  PRN Reason Stop Dose Admin


 


Acetaminophen  650 mg  01/11/18 23:57  01/13/18 21:37





  Tylenol Suppository -  [place]   650 mg





  Q6H PRN   Administration





  FEVER   


 


Albuterol/Ipratropium  1 amp  01/10/18 20:00  01/14/18 08:40





  Duoneb -  NEB   1 amp





  RQID DOROTHY   Administration


 


Alteplase, Recombinant  2 mg  01/12/18 17:30  





  Cathflo Activase -  CVP  01/12/18 17:31  





  ONCE ONE   


 


Aspirin  81 mg  01/11/18 10:00  01/14/18 10:32





  Asa -  NGT   81 mg





  DAILY DOROTHY   Administration


 


Atorvastatin Calcium  10 mg  01/10/18 22:00  01/13/18 21:37





  Lipitor -  NGT   10 mg





  HS DOROTHY   Administration


 


Heparin Sodium (Porcine)  5,000 unit  01/10/18 22:00  01/14/18 06:16





  Heparin -  SQ   5,000 unit





  TID DOROTHY   Administration


 


Levetiracetam 1,500 mg/ Sodium  115 mls @ 460 mls/hr  01/13/18 10:00  01/14/18 

10:33





  Chloride  IVPB   460 mls/hr





  BID DOROTHY   Administration


 


Dextrose  1,000 mls @ 42 mls/hr  01/13/18 14:31  01/13/18 14:33





  D5w -  IV   42 mls/hr





  Q23H DOROTHY   Administration


 


Insulin Aspart  1 vial  01/12/18 16:30  01/14/18 11:21





  Novolog Vial Sliding Scale -  SQ   2 units





  ACHS DOROTHY   Administration





  Protocol   


 


Lorazepam  2 mg  01/13/18 02:01  01/13/18 13:10





  Ativan Injection -  IVPUSH   2 mg





  Q4H PRN   Administration





  ANXIETY   


 


Methylprednisolone Sodium Succinate  60 mg  01/10/18 14:45  01/14/18 10:32





  Solu-Medrol -  IVPB   60 mg





  BID DOROTHY   Administration


 


Morphine Sulfate  2 mg  01/11/18 11:52  01/12/18 15:38





  Morphine Injection -  IVPUSH   2 mg





  Q3H PRN   Administration





  ANXIETY   


 


Pantoprazole Sodium  40 mg  01/11/18 10:00  01/14/18 10:32





  Protonix Packets For Oral Suspension -  NGT   40 mg





  DAILY DOROTHY   Administration








 





Impression


1. s/p cardiac arrest


2. chronic respiratory failure


3. history of cardiac arrest in the past as well


4. anemia


5. hyperlipidemia


6. aortic valve disease


7. anxiety


8. urinary retention


9. insomnia


10. DM


11. a-flutter


12. transaminitis


13. azotemia


14. hypernatremia





Plan


continue current management


family reportedly considering extubation


would repeat bmp today and adjust fluids





MV

## 2018-01-14 NOTE — PN
Progress Note (short form)





- Note


Progress Note: 


Neurology Follow up note


  


 Follow up visit for anoxic encephalopathy, as he has episode of seizure two 

days ago, his keppra was increased


No more seizures , since than.


He is off sedation, There has not been any reponse . He is completely vent 

dependent.





Neurological Examination


Unresponsive to pain, or verbal stimmuli


Pupils is unequal and unreponsive








Assessment:Severe anoxic encephalopathy


Plan: continue high dose of keppra 1500 mg bid and supportive care





Thanking you so much,


Leo Rubi MD

## 2018-01-14 NOTE — PN
Progress Note, Physician


Chief Complaint: 





FAMILY BEDSIDE


NO CHANGE


STILL UNRESPONSIVE





- Current Medication List


Current Medications: 


Active Medications





Acetaminophen (Tylenol Suppository -)  650 mg [place] Q6H PRN


   PRN Reason: FEVER


   Last Admin: 01/13/18 21:37 Dose:  650 mg


Levetiracetam 1,500 mg/ Sodium (Chloride)  115 mls @ 460 mls/hr IVPB BID [facility]


   Last Admin: 01/14/18 10:33 Dose:  460 mls/hr


Dextrose (D5w -)  1,000 mls @ 42 mls/hr IV Q23H [facility]


   Last Admin: 01/13/18 14:33 Dose:  42 mls/hr


Lorazepam (Ativan Injection -)  2 mg IVPUSH Q4H PRN


   PRN Reason: ANXIETY


   Last Admin: 01/13/18 13:10 Dose:  2 mg


Morphine Sulfate (Morphine Injection -)  2 mg IVPUSH Q3H PRN


   PRN Reason: ANXIETY


   Last Admin: 01/12/18 15:38 Dose:  2 mg











- Objective


Vital Signs: 


 Vital Signs











Temperature  99.1 F   01/14/18 09:00


 


Pulse Rate  96 H  01/14/18 09:00


 


Respiratory Rate  18   01/14/18 09:00


 


Blood Pressure  138/70   01/14/18 09:00


 


O2 Sat by Pulse Oximetry (%)  97   01/14/18 08:40











Constitutional: Yes: Severe Distress


Respiratory: Yes: Mechanically Ventilated


Genitourinary: Yes: Lion Present


Edema: Yes


Neurological: Yes: Unresponsive


Labs: 


 CBC, BMP





 01/12/18 20:20 





 01/13/18 07:00 





 INR, PTT











INR  1.18  (0.82-1.09)  H  01/12/18  20:20    














Problem List





- Problems


(1) Anoxia


Code(s): R09.02 - HYPOXEMIA   





(2) Cardiac arrest


Code(s): I46.9 - CARDIAC ARREST, CAUSE UNSPECIFIED   





(3) DVT prophylaxis


Code(s): [id number] -    





(4) Acute and chronic respiratory failure with hypercapnia


Code(s): J96.22 - ACUTE AND CHRONIC RESPIRATORY FAILURE WITH HYPERCAPNIA   





(5) Acute hyperkalemia


Code(s): E87.5 - HYPERKALEMIA   





(6) Acute on chronic systolic (congestive) heart failure


Code(s): I50.23 - ACUTE ON CHRONIC SYSTOLIC (CONGESTIVE) HEART FAILURE   





(7) Acute renal failure (ARF)


Code(s): N17.9 - ACUTE KIDNEY FAILURE, UNSPECIFIED   


Qualifiers: 


   Acute renal failure type: unspecified   Qualified Code(s): N17.9 - Acute 

kidney failure, unspecified   





(8) Anemia


Code(s): D64.9 - ANEMIA, UNSPECIFIED   


Qualifiers: 


   Anemia type: other cause   Other causes of anemia: chronic disease, other   

Qualified Code(s): D63.8 - Anemia in other chronic diseases classified 

elsewhere   





Assessment/Plan





PALLIATIVE CARE


WITHDRAWEL OF CARE


COMFORT MEASURES ONLY


STOP LAB DRAWS

## 2018-01-15 LAB
ANION GAP SERPL CALC-SCNC: 5 MMOL/L (ref 8–16)
BUN SERPL-MCNC: 40 MG/DL (ref 7–18)
CALCIUM SERPL-MCNC: 7.1 MG/DL (ref 8.5–10.1)
CHLORIDE SERPL-SCNC: 116 MMOL/L (ref 98–107)
CO2 SERPL-SCNC: 31 MMOL/L (ref 21–32)
CREAT SERPL-MCNC: 0.5 MG/DL (ref 0.7–1.3)
GLUCOSE SERPL-MCNC: 196 MG/DL (ref 74–106)
POTASSIUM SERPLBLD-SCNC: 4.8 MMOL/L (ref 3.5–5.1)
SODIUM SERPL-SCNC: 152 MMOL/L (ref 136–145)

## 2018-01-15 RX ADMIN — LEVETIRACETAM SCH MLS/HR: 100 INJECTION, SOLUTION, CONCENTRATE INTRAVENOUS at 12:05

## 2018-01-15 RX ADMIN — MORPHINE SULFATE PRN MG: 10 INJECTION, SOLUTION INTRAMUSCULAR; INTRAVENOUS at 15:52

## 2018-01-15 RX ADMIN — MORPHINE SULFATE PRN MG: 10 INJECTION, SOLUTION INTRAMUSCULAR; INTRAVENOUS at 08:12

## 2018-01-15 RX ADMIN — MORPHINE SULFATE PRN MG: 10 INJECTION, SOLUTION INTRAMUSCULAR; INTRAVENOUS at 03:02

## 2018-01-15 RX ADMIN — LEVETIRACETAM SCH MLS/HR: 100 INJECTION, SOLUTION, CONCENTRATE INTRAVENOUS at 23:36

## 2018-01-15 RX ADMIN — DEXTROSE MONOHYDRATE SCH MLS/HR: 50 INJECTION, SOLUTION INTRAVENOUS at 14:36

## 2018-01-15 RX ADMIN — MORPHINE SULFATE PRN MG: 10 INJECTION, SOLUTION INTRAMUSCULAR; INTRAVENOUS at 12:05

## 2018-01-15 NOTE — PN
Progress Note, Physician


History of Present Illness: 





PULMONARY





UNRESPONSIVE ON VENT SUPPORT AC MODE





- Current Medication List


Current Medications: 


Active Medications





Acetaminophen (Tylenol Suppository -)  650 mg ND Q6H PRN


   PRN Reason: FEVER


   Last Admin: 01/13/18 21:37 Dose:  650 mg


Levetiracetam 1,500 mg/ Sodium (Chloride)  115 mls @ 460 mls/hr IVPB BID [facility]


   Last Admin: 01/15/18 12:05 Dose:  460 mls/hr


Dextrose (D5w -)  1,000 mls @ 42 mls/hr IV Q23H [facility]


   Last Admin: 01/15/18 14:36 Dose:  42 mls/hr


Lorazepam (Ativan Injection -)  2 mg IVPUSH Q4H PRN


   PRN Reason: ANXIETY


   Last Admin: 01/13/18 13:10 Dose:  2 mg


Morphine Sulfate (Morphine Injection -)  2 mg IVPUSH Q3H PRN


   PRN Reason: ANXIETY


   Last Admin: 01/15/18 12:05 Dose:  2 mg











- Objective


Vital Signs: 


 Vital Signs











Temperature  100.2 F H  01/15/18 14:45


 


Pulse Rate  110 H  01/15/18 14:45


 


Respiratory Rate  14   01/15/18 14:45


 


Blood Pressure  146/92   01/15/18 14:45


 


O2 Sat by Pulse Oximetry (%)  98   01/14/18 21:00











Constitutional: Yes: Thin, Other (UNRESPONSIVE)


Eyes: Yes: WNL


HENT: Yes: WNL


Neck: Yes: Supple (TRACH)


Cardiovascular: Yes: Regular Rate and Rhythm, S1, S2


Respiratory: Yes: Diminished


Gastrointestinal: Yes: Normal Bowel Sounds, Soft


Extremities: Yes: WNL


Edema: Yes


Labs: 


 CBC, BMP





 01/12/18 20:20 





 01/15/18 07:20 





 INR, PTT











INR  1.18  (0.82-1.09)  H  01/12/18  20:20    














Problem List





- Problems


(1) Anoxia


Code(s): R09.02 - HYPOXEMIA   





(2) Azotemia


Code(s): R79.89 - OTHER SPECIFIED ABNORMAL FINDINGS OF BLOOD CHEMISTRY   





(3) Cardiac arrest


Code(s): I46.9 - CARDIAC ARREST, CAUSE UNSPECIFIED   





(4) Acute and chronic respiratory failure with hypercapnia


Code(s): J96.22 - ACUTE AND CHRONIC RESPIRATORY FAILURE WITH HYPERCAPNIA   





(5) CAD (coronary artery disease)


Code(s): I25.10 - ATHSCL HEART DISEASE OF NATIVE CORONARY ARTERY W/O ANG PCTRS 

  





(6) COPD (chronic obstructive pulmonary disease)


Code(s): J44.9 - CHRONIC OBSTRUCTIVE PULMONARY DISEASE, UNSPECIFIED   


Qualifiers: 


   Emphysema type: unspecified 





(7) Chronic respiratory failure


Code(s): J96.10 - CHRONIC RESPIRATORY FAILURE, UNSP W HYPOXIA OR HYPERCAPNIA   


Qualifiers: 


   Respiratory failure complication: hypoxia and hypercapnia   Qualified Code(s)

: J96.11 - Chronic respiratory failure with hypoxia   





(8) Respiratory failure


Code(s): J96.90 - RESPIRATORY FAILURE, UNSP, UNSP W HYPOXIA OR HYPERCAPNIA   


Qualifiers: 


   Chronicity: acute on chronic 





Assessment/Plan





A/P


PEA Cardiac Arrest


Acute on Chronic Hypercapneic and Hypoxic Respiratory Failure


Severe COPD - r/o Acute Exacerbation


Paroxysmal Atrial Flutter


CAD s/p CABG 


s/p bioAVR


Lactic Acidosis resolved


Elevated LFTs likely ischemic injury


Severe LV Systolic Dysfunction


+Troponins likely Demand Ischemia


HTN


Hyperlipidemia


DM


Seizures





-  continue supportive care


-  agree with comfort measures, palliation











DR STEPHENS

## 2018-01-15 NOTE — PN
Progress Note, Physician


Chief Complaint: 





FAMILY MEETING TODAY


SCHEDULED FOR TERMINAL WEAN TOMORROW NOON


PALLIATIVE CARE AWARE


CLERGY HAS VISITED PATIENT





- Current Medication List


Current Medications: 


Active Medications





Acetaminophen (Tylenol Suppository -)  650 mg NV Q6H PRN


   PRN Reason: FEVER


   Last Admin: 01/13/18 21:37 Dose:  650 mg


Levetiracetam 1,500 mg/ Sodium (Chloride)  115 mls @ 460 mls/hr IVPB BID [facility]


   Last Admin: 01/15/18 12:05 Dose:  460 mls/hr


Dextrose (D5w -)  1,000 mls @ 42 mls/hr IV Q23H [facility]


   Last Admin: 01/15/18 14:36 Dose:  42 mls/hr


Lorazepam (Ativan Injection -)  2 mg IVPUSH Q4H PRN


   PRN Reason: ANXIETY


   Last Admin: 01/13/18 13:10 Dose:  2 mg


Morphine Sulfate (Morphine Injection -)  2 mg IVPUSH Q3H PRN


   PRN Reason: ANXIETY


   Last Admin: 01/15/18 12:05 Dose:  2 mg











- Objective


Vital Signs: 


 Vital Signs











Temperature  100.2 F H  01/15/18 14:45


 


Pulse Rate  110 H  01/15/18 14:45


 


Respiratory Rate  14   01/15/18 14:45


 


Blood Pressure  146/92   01/15/18 14:45


 


O2 Sat by Pulse Oximetry (%)  98   01/14/18 21:00











Constitutional: Yes: Severe Distress


Eyes: Yes: Other


HENT: Yes: Other


Neck: Yes: WNL


Cardiovascular: Yes: Tachycardia


Respiratory: Yes: Mechanically Ventilated


Gastrointestinal: Yes: WNL


Genitourinary: Yes: Lion Present


Musculoskeletal: Yes: Muscle Weakness


Extremities: Yes: Other


Edema: Yes


Integumentary: Yes: WNL


Wound/Incision: Yes: Clean/Dry


Neurological: Yes: Pre-Existing Deficit, Unresponsive, Weakness


...Motor Strength: LUE, LLE, RUE, RLE


Labs: 


 CBC, BMP





 01/12/18 20:20 





 01/15/18 07:20 





 INR, PTT











INR  1.18  (0.82-1.09)  H  01/12/18  20:20    














Problem List





- Problems


(1) Anoxia


Code(s): R09.02 - HYPOXEMIA   





(2) Cardiac arrest


Code(s): I46.9 - CARDIAC ARREST, CAUSE UNSPECIFIED   





(3) DVT prophylaxis


Code(s): VBQ0772 -    





(4) Acute and chronic respiratory failure with hypercapnia


Code(s): J96.22 - ACUTE AND CHRONIC RESPIRATORY FAILURE WITH HYPERCAPNIA   





(5) Acute hyperkalemia


Code(s): E87.5 - HYPERKALEMIA   





(6) Acute on chronic systolic (congestive) heart failure


Code(s): I50.23 - ACUTE ON CHRONIC SYSTOLIC (CONGESTIVE) HEART FAILURE   





(7) Acute renal failure (ARF)


Code(s): N17.9 - ACUTE KIDNEY FAILURE, UNSPECIFIED   


Qualifiers: 


   Acute renal failure type: unspecified   Qualified Code(s): N17.9 - Acute 

kidney failure, unspecified   





(8) Anemia


Code(s): D64.9 - ANEMIA, UNSPECIFIED   


Qualifiers: 


   Anemia type: other cause   Other causes of anemia: chronic disease, other   

Qualified Code(s): D63.8 - Anemia in other chronic diseases classified 

elsewhere   





Assessment/Plan





TERMINAL WEAN OFF VENT TOMORROW


PALLIATIVE  CARE AWARE


MORPHINE PRN


COMFORT CARE FOR NOW

## 2018-01-15 NOTE — PN
Progress Note, Physician


History of Present Illness: 





Pt seen and examined at bedside. He remains lethargic. 





- Current Medication List


Current Medications: 


Active Medications





Acetaminophen (Tylenol Suppository -)  650 mg [place] Q6H PRN


   PRN Reason: FEVER


   Last Admin: 01/13/18 21:37 Dose:  650 mg


Levetiracetam 1,500 mg/ Sodium (Chloride)  115 mls @ 460 mls/hr IVPB BID DOROTHY


   Last Admin: 01/15/18 12:05 Dose:  460 mls/hr


Dextrose (D5w -)  1,000 mls @ 42 mls/hr IV Q23H DOROTHY


   Last Admin: 01/14/18 13:55 Dose:  42 mls/hr


Lorazepam (Ativan Injection -)  2 mg IVPUSH Q4H PRN


   PRN Reason: ANXIETY


   Last Admin: 01/13/18 13:10 Dose:  2 mg


Morphine Sulfate (Morphine Injection -)  2 mg IVPUSH Q3H PRN


   PRN Reason: ANXIETY


   Last Admin: 01/15/18 12:05 Dose:  2 mg











- Objective


Vital Signs: 


 Vital Signs











Temperature  99.4 F   01/15/18 06:00


 


Pulse Rate  87   01/15/18 06:00


 


Respiratory Rate  14   01/15/18 09:26


 


Blood Pressure  136/65   01/15/18 06:00


 


O2 Sat by Pulse Oximetry (%)  98   01/14/18 21:00











Constitutional: Yes: Calm


Neck: Yes: Other (trache)


Cardiovascular: Yes: S1, S2


Respiratory: Yes: Mechanically Ventilated


Gastrointestinal: Yes: Soft


Genitourinary: Yes: Incontinence


Musculoskeletal: Yes: Muscle Weakness


Edema: Yes


Edema: LUE: 1+, RUE: 1+, LLE: 1+, RLE: 1+


Neurological: Yes: Lethargy


Labs: 


 CBC, BMP





 01/12/18 20:20 





 01/15/18 07:20 





 INR, PTT











INR  1.18  (0.82-1.09)  H  01/12/18  20:20    














Problem List





- Problems


(1) Azotemia


Code(s): R79.89 - OTHER SPECIFIED ABNORMAL FINDINGS OF BLOOD CHEMISTRY   





(2) Cardiac arrest


Code(s): I46.9 - CARDIAC ARREST, CAUSE UNSPECIFIED   





Assessment/Plan


 Current Medications











Generic Name Dose Route Start Last Admin





  Trade Name Freq  PRN Reason Stop Dose Admin


 


Acetaminophen  650 mg  01/11/18 23:57  01/13/18 21:37





  Tylenol Suppository -  [place]   650 mg





  Q6H PRN   Administration





  FEVER   


 


Levetiracetam 1,500 mg/ Sodium  115 mls @ 460 mls/hr  01/13/18 10:00  01/15/18 

12:05





  Chloride  IVPB   460 mls/hr





  BID DOROTHY   Administration


 


Dextrose  1,000 mls @ 42 mls/hr  01/13/18 14:31  01/14/18 13:55





  D5w -  IV   42 mls/hr





  Q23H DOROTHY   Administration


 


Lorazepam  2 mg  01/13/18 02:01  01/13/18 13:10





  Ativan Injection -  IVPUSH   2 mg





  Q4H PRN   Administration





  ANXIETY   


 


Morphine Sulfate  2 mg  01/11/18 11:52  01/15/18 12:05





  Morphine Injection -  IVPUSH   2 mg





  Q3H PRN   Administration





  ANXIETY   











Impression


1. s/p cardiac arrest


2. chronic respiratory failure


3. history of cardiac arrest in the past as well


4. anemia


5. hyperlipidemia


6. aoritc valve disease


7. anxiety


8. urinary retention


9. insomnia


10. DM


11. a-flutter


12. transaminitis


13. azotemia


14. hypernatremia





Plan


- labs reviewed 


- chart reviewed


- discussed with family at bedside


- pt on comfort care


- will follow PRN








Dr Thakkar

## 2018-01-16 VITALS — DIASTOLIC BLOOD PRESSURE: 84 MMHG | HEART RATE: 120 BPM | SYSTOLIC BLOOD PRESSURE: 142 MMHG

## 2018-01-16 VITALS — TEMPERATURE: 99.9 F

## 2018-01-16 RX ADMIN — DEXTROSE MONOHYDRATE SCH: 50 INJECTION, SOLUTION INTRAVENOUS at 14:00

## 2018-01-16 RX ADMIN — LORAZEPAM PRN MG: 2 INJECTION INTRAMUSCULAR; INTRAVENOUS at 12:15

## 2018-01-16 RX ADMIN — MORPHINE SULFATE PRN MG: 10 INJECTION, SOLUTION INTRAMUSCULAR; INTRAVENOUS at 10:46

## 2018-01-16 RX ADMIN — LEVETIRACETAM SCH MLS/HR: 100 INJECTION, SOLUTION, CONCENTRATE INTRAVENOUS at 10:30

## 2018-01-16 NOTE — HOSP
Physical Examination


Vital Signs: 


 Vital Signs











Temperature  99.9 F H  01/16/18 06:00


 


Pulse Rate  120 H  01/16/18 09:30


 


Respiratory Rate  14   01/16/18 09:59


 


Blood Pressure  142/84   01/16/18 09:30


 


O2 Sat by Pulse Oximetry (%)  97   01/16/18 09:00











Labs: 


 CBC, BMP





 01/12/18 20:20 





 01/15/18 07:20

## 2018-01-16 NOTE — PN
Progress Note (short form)





- Note


Progress Note: 





discussed with patients wife and sons. The wife blas who gives full authority 

to her sons marla and cedric to make all decisions for their dad (her ). 

Will terminally wean as directed.





Problem List





- Problems


(1) Anoxia


Code(s): R09.02 - HYPOXEMIA   





(2) Cardiac arrest


Code(s): I46.9 - CARDIAC ARREST, CAUSE UNSPECIFIED   





(3) DVT prophylaxis


Code(s): EXB0909 -    





(4) Acute and chronic respiratory failure with hypercapnia


Code(s): J96.22 - ACUTE AND CHRONIC RESPIRATORY FAILURE WITH HYPERCAPNIA   





(5) Acute hyperkalemia


Code(s): E87.5 - HYPERKALEMIA   





(6) Acute on chronic systolic (congestive) heart failure


Code(s): I50.23 - ACUTE ON CHRONIC SYSTOLIC (CONGESTIVE) HEART FAILURE   





(7) Acute renal failure (ARF)


Code(s): N17.9 - ACUTE KIDNEY FAILURE, UNSPECIFIED   


Qualifiers: 


   Acute renal failure type: unspecified   Qualified Code(s): N17.9 - Acute 

kidney failure, unspecified   





(8) Anemia


Code(s): D64.9 - ANEMIA, UNSPECIFIED   


Qualifiers: 


   Anemia type: other cause   Other causes of anemia: chronic disease, other   

Qualified Code(s): D63.8 - Anemia in other chronic diseases classified 

elsewhere

## 2018-01-16 NOTE — PN
Progress Note, Physician


Chief Complaint: 





Respiratory arrest


History of Present Illness: 





on mechanical vent


family at bedside


wife not at bedside





- Current Medication List


Current Medications: 


Active Medications





Acetaminophen (Tylenol Suppository -)  650 mg [place] Q6H PRN


   PRN Reason: FEVER


   Last Admin: 01/13/18 21:37 Dose:  650 mg


Levetiracetam 1,500 mg/ Sodium (Chloride)  115 mls @ 460 mls/hr IVPB BID DOROTHY


   Last Admin: 01/16/18 10:30 Dose:  460 mls/hr


Morphine Sulfate 100 mg/ (Sodium Chloride)  100 mls @ 1 mls/hr IVPB TITR DOROTHY; 1 

MG/HR


   PRN Reason: Protocol


Lorazepam (Ativan Injection -)  2 mg IVPUSH Q4H PRN


   PRN Reason: ANXIETY


   Last Admin: 01/16/18 12:15 Dose:  2 mg


Scopolamine HBr (Transderm-Scop -)  1 patch TD Q72H DOROTHY


   Last Admin: 01/15/18 23:37 Dose:  1 patch











- Objective


Vital Signs: 


 Vital Signs











Temperature  99.9 F H  01/16/18 06:00


 


Pulse Rate  120 H  01/16/18 09:30


 


Respiratory Rate  14   01/16/18 09:59


 


Blood Pressure  142/84   01/16/18 09:30


 


O2 Sat by Pulse Oximetry (%)  97   01/16/18 09:00











Constitutional: Yes: Well Nourished, Calm, Mild Distress


Cardiovascular: Yes: Regular Rate and Rhythm


Respiratory: Yes: Mechanically Ventilated


Gastrointestinal: Yes: WNL


Musculoskeletal: Yes: WNL


Extremities: Yes: WNL


Neurological: Yes: Unresponsive


Labs: 


 CBC, BMP





 01/12/18 20:20 





 01/15/18 07:20 





 INR, PTT











INR  1.18  (0.82-1.09)  H  01/12/18  20:20    














Problem List





- Problems


(1) Cardiac arrest


Assessment/Plan: 


- on mechanical vent,


-Dr. Garcia spoke to wife today, sons are aware


-patient to be compassionately weaned


Code(s): I46.9 - CARDIAC ARREST, CAUSE UNSPECIFIED   





(2) Acute and chronic respiratory failure with hypercapnia


Code(s): J96.22 - ACUTE AND CHRONIC RESPIRATORY FAILURE WITH HYPERCAPNIA   





(3) Ventilator dependence


Code(s): Z99.11 - DEPENDENCE ON RESPIRATOR [VENTILATOR] STATUS   





Assessment/Plan





see problem list